# Patient Record
Sex: FEMALE | Race: WHITE | Employment: OTHER | URBAN - NONMETROPOLITAN AREA
[De-identification: names, ages, dates, MRNs, and addresses within clinical notes are randomized per-mention and may not be internally consistent; named-entity substitution may affect disease eponyms.]

---

## 2017-01-16 DIAGNOSIS — I35.0 AORTIC VALVE STENOSIS: Primary | ICD-10-CM

## 2017-01-23 ENCOUNTER — HOSPITAL ENCOUNTER (OUTPATIENT)
Dept: ULTRASOUND IMAGING | Facility: HOSPITAL | Age: 77
Discharge: HOME | End: 2017-01-23
Attending: NURSE PRACTITIONER | Admitting: NURSE PRACTITIONER
Payer: COMMERCIAL

## 2017-01-23 PROCEDURE — 93306 TTE W/DOPPLER COMPLETE: CPT | Mod: 26 | Performed by: INTERNAL MEDICINE

## 2017-01-23 PROCEDURE — 93306 TTE W/DOPPLER COMPLETE: CPT | Mod: TC

## 2017-08-20 ENCOUNTER — HOSPITAL ENCOUNTER (EMERGENCY)
Facility: HOSPITAL | Age: 77
Discharge: HOME | End: 2017-08-20
Attending: NURSE PRACTITIONER | Admitting: NURSE PRACTITIONER
Payer: OTHER MISCELLANEOUS

## 2017-08-20 VITALS
HEART RATE: 58 BPM | RESPIRATION RATE: 16 BRPM | OXYGEN SATURATION: 99 % | SYSTOLIC BLOOD PRESSURE: 217 MMHG | DIASTOLIC BLOOD PRESSURE: 69 MMHG | TEMPERATURE: 97.9 F

## 2017-08-20 DIAGNOSIS — S80.02XA CONTUSION OF LEFT KNEE, INITIAL ENCOUNTER: ICD-10-CM

## 2017-08-20 PROCEDURE — 99213 OFFICE O/P EST LOW 20 MIN: CPT

## 2017-08-20 PROCEDURE — 73562 X-RAY EXAM OF KNEE 3: CPT | Mod: TC,LT

## 2017-08-20 PROCEDURE — 99213 OFFICE O/P EST LOW 20 MIN: CPT | Performed by: NURSE PRACTITIONER

## 2017-08-20 NOTE — ED PROVIDER NOTES
History     Chief Complaint   Patient presents with     Knee Pain     slipped on floor, falling onto left knee about 1300     The history is provided by the patient. No  was used.     Shawanda Luis is a 76 year old female who presents with left knee pain and bruising. Slipped at work and fell directly down onto the knee. Walked since the injury. No previous injury. Increased pain with activity. Nothing taken for pain.     I have reviewed the Medications, Allergies, Past Medical and Surgical History, and Social History in the Epic system.    Allergies: No Known Allergies      No current facility-administered medications on file prior to encounter.   Current Outpatient Prescriptions on File Prior to Encounter:  metoprolol (LOPRESSOR) 25 MG tablet Take 2 tablets (50 mg) by mouth 2 times daily   oxyCODONE (ROXICODONE) 5 MG immediate release tablet Take 1-2 tablets (5-10 mg) by mouth every 4 hours as needed for moderate to severe pain   aspirin  MG EC tablet Take 1 tablet (325 mg) by mouth daily   senna-docusate (SENOKOT-S;PERICOLACE) 8.6-50 MG per tablet Take 1-2 tablets by mouth 2 times daily   amoxicillin (AMOXIL) 500 MG capsule 500mg tablets x four PO one time prior to dental procedure.Take one hour prior to appoint ment time with water   ferrous sulfate 325 (65 FE) MG tablet Take 325 mg by mouth daily (with breakfast)   ATORVASTATIN CALCIUM PO Take 40 mg by mouth   LISINOPRIL PO Take 20 mg by mouth daily    Levothyroxine Sodium (SYNTHROID PO) Take 75 mcg by mouth daily   triamterene-hydrochlorothiazide (MAXZIDE-25) 37.5-25 MG per tablet Take 1 tablet by mouth daily   latanoprost (XALATAN) 0.005 % ophthalmic solution Place 1 drop Into the left eye At Bedtime   Cyanocobalamin (VITAMIN B12 PO) Take by mouth daily       Patient Active Problem List   Diagnosis     Heart murmur     Thyroid disease     Severe aortic stenosis     Syncope     Acute blood loss anemia     GI bleed     Closed  "fibular fracture     CAD (coronary artery disease)     Essential hypertension     Hypothyroidism     Hyperlipidemia     S/P CABG (coronary artery bypass graft)     Nausea     Chest pain       Past Surgical History:   Procedure Laterality Date     BYPASS GRAFT ARTERY CORONARY, REPLACE VALVE AORTIC, COMBINED N/A 12/18/2014    Procedure: COMBINED BYPASS GRAFT ARTERY CORONARY, REPLACE VALVE AORTIC;  Surgeon: Butch English MD;  Location:  OR     ENT SURGERY       EYE SURGERY       PHACOEMULSIFICATION WITH STANDARD INTRAOCULAR LENS IMPLANT  4/8/2014    Procedure: PHACOEMULSIFICATION WITH STANDARD INTRAOCULAR LENS IMPLANT;  CATARACT EXTRACTION WITH INTRA OCULAR LENS LEFT;  Surgeon: Arpit Albright MD;  Location: [facility]       Social History   Substance Use Topics     Smoking status: Former Smoker     Smokeless tobacco: Not on file     Alcohol use No         There is no immunization history on file for this patient.    BMI: Estimated body mass index is 19.99 kg/(m^2) as calculated from the following:    Height as of 3/10/15: 1.664 m (5' 5.5\").    Weight as of 3/10/15: 55.3 kg (122 lb).      Review of Systems   Constitutional: Negative.    Musculoskeletal: Positive for gait problem and joint swelling (Left knee with ecchymosis).   Skin: Positive for color change (Ecchymosis to anterior left knee). Negative for rash and wound.       Physical Exam   BP: (!) 217/69 (hx of hypertension, takes medications for)  Pulse: 58  Temp: 97.9  F (36.6  C)  Resp: 16  SpO2: 99 %  Physical Exam   Constitutional: She is oriented to person, place, and time. She appears well-developed and well-nourished. No distress.   HENT:   Head: Normocephalic and atraumatic.   Nose: Nose normal.   Mouth/Throat: No oropharyngeal exudate.   Eyes: Conjunctivae are normal.   Neck: Normal range of motion.   Cardiovascular: Normal rate.    Pulmonary/Chest: Effort normal.   Musculoskeletal:        Left knee: She exhibits decreased range of motion, swelling, " ecchymosis and bony tenderness. She exhibits no effusion, no deformity, no laceration, no erythema, normal alignment, no LCL laxity, normal patellar mobility, normal meniscus and no MCL laxity.        Left ankle: Normal.        Left upper leg: Normal.        Left lower leg: Normal.        Legs:  Neurological: She is alert and oriented to person, place, and time.   Skin: Skin is warm and dry. She is not diaphoretic.   Psychiatric: She has a normal mood and affect.   Nursing note and vitals reviewed.      ED Course     ED Course     Procedures          Left knee XR: I personally reviewed the x-rays and there is no fracture or dislocation. Radiology review pending and nurse will notify patient if there is any change in the treatment plan.    Labs Ordered and Resulted from Time of ED Arrival Up to the Time of Departure from the ED - No data to display    Assessments & Plan (with Medical Decision Making)     I have reviewed the nursing notes.  I have reviewed the findings, diagnosis, plan and need for follow up with the patient.  Placed in an ace. Ordered a cane for her to get from [facility], crutches would increase her fall risk.   She could use a walker if she had one at home. Advised to ice and elevate.   Take Tylenol for pain.   Follow up with PCP if not improving in the next 3 days.   See PCP if return to work release is needed.   Return here if sx worsen.      Discharge Medication List as of 8/20/2017  6:45 PM      START taking these medications    Details   order for DME Dispense 1 CANE for assistance with walking    Dx: Contusion to left knee d/t fallDisp-1 Units, R-0, Local Print             Final diagnoses:   Contusion of left knee, initial encounter       8/20/2017   [facility]     Analisa Najera NP  08/21/17 [id number]

## 2017-08-20 NOTE — ED AVS SNAPSHOT
[facility]    [address] [address]    Phone:  [phone]                                       Shawanda Luis   MRN: [id number]    Department:  [facility]   Date of Visit:  8/20/2017           After Visit Summary Signature Page     I have received my discharge instructions, and my questions have been answered. I have discussed any challenges I see with this plan with the nurse or doctor.    ..........................................................................................................................................  Patient/Patient Representative Signature      ..........................................................................................................................................  Patient Representative Print Name and Relationship to Patient    ..................................................               ................................................  Date                                            Time    ..........................................................................................................................................  Reviewed by Signature/Title    ...................................................              ..............................................  Date                                                            Time

## 2017-08-20 NOTE — DISCHARGE INSTRUCTIONS
The radiologist will review your x-rays tomorrow. The nurse will call if he reads the x-ray differently.   Wear the ace wrap during the day. Ice and elevate the knee.   Use the cane to assist with walking and decrease the stress to the painful knee.  Take Tylenol for pain.   Avoid overuse or straining the knee.   No work for 3 days.     Call for a follow up appointment to be seen on Wednesday by your PCP or one of his associated.   They will re-evaluate and determine if you should return to work.         Lower Extremity Contusion  You have a contusion (bruise) of a lower extremity (leg, knee, ankle, foot, or toe). Symptoms include pain, swelling, and skin discoloration. No bones are broken. This injury may take from a few days to a few weeks to heal.  During that time, the bruise may change from reddish in color, to purple-blue, to green-yellow, to yellow-brown.  Home care    Unless another medicine was prescribed, you can take acetaminophen, ibuprofen, or naproxen to control pain. (If you have chronic liver or kidney disease or ever had a stomach ulcer or gastrointestinal bleeding, talk with your doctor before using these medicines.)    Elevate the injured area to reduce pain and swelling. As much as possible, sit or lie down with the injured area raised about the level of your heart. This is especially important during the first 48 hours.    Ice the injured area to help reduce pain and swelling. Wrap a cold source (ice pack or ice cubes in a plastic bag) in a thin towel. Apply to the bruised area for 20 minutes every 1 to 2 hours the first day. Continue this 3 to 4 times a day until the pain and swelling goes away.    If crutches have been advised, do not bear full weight on the injured leg until you can do so without pain. You may return to sports when you are able to put full weight and impact on the injured leg without pain.  Follow up  Follow up with your healthcare provider or our staff as advised. Call if  you are not improving within the next 1 to 2 weeks.  When to seek medical advice   Call your healthcare provider right away if any of these occur:    Increased pain or swelling    Foot or toes become cold, blue, numb or tingly    Signs of infection: Warmth, drainage, or increased redness or pain around the injury    Inability to move the injured area     Frequent bruising for unknown reasons  Date Last Reviewed: 2/1/2017 2000-2017 [employer]. [address] [place] [address], [place] [address]. All rights reserved. This information is not intended as a substitute for professional medical care. Always follow your healthcare professional's instructions.

## 2017-08-20 NOTE — ED AVS SNAPSHOT
[facility]    [address] [address]    Phone:  [phone]                                       Shawanda Luis   MRN: [id number]    Department:  [facility]   Date of Visit:  8/20/2017           Patient Information     Date Of Birth          1940        Your diagnoses for this visit were:     Contusion of left knee, initial encounter        You were seen by Analisa Najera NP.      Follow-up Information     Follow up with Denton Baugh MD. Schedule an appointment as soon as possible for a visit in 3 days.    Specialty:  Family Practice    Why:  for re-evaluation and release to work    Contact information:    [facility]  [address] [address] [phone]          Follow up with [facility].    Specialty:  EMERGENCY MEDICINE    Why:  If symptoms worsen    Contact information:    [address] [address] [phone]    Additional information:    From [facility]: Take [address]. Turn left at [address]. Turn left at the first stoplight on [address]. At the first stop sign, take a right onto [address]. Take a left into the parking lot and continue through until you reach the North enterance of the building.       From [place]: Take [address]. Take the [address] ramp towards [place]. Turn left onto [address]. Take a slight right onto [address]. Turn left at the first stoplight on [address]. At the first stop sign, take a right onto [address]. Take a left into the parking lot and continue through until you reach the North enterance of the building.       From [place]: Take [address]. Take a right at [address]. At the first stop sign, take a right onto [address]. Take a left into the parking lot and continue through until you reach the North enterance of the building.         Discharge  Infant transferred to 3023 via mother's arms in stable condition.  Report given to postpartum RN.  ID bands verified.      Sussy Roman RN   Instructions       The radiologist will review your x-rays tomorrow. The nurse will call if he reads the x-ray differently.   Wear the ace wrap during the day. Ice and elevate the knee.   Use the cane to assist with walking and decrease the stress to the painful knee.  Take Tylenol for pain.   Avoid overuse or straining the knee.   No work for 3 days.     Call for a follow up appointment to be seen on Wednesday by your PCP or one of his associated.   They will re-evaluate and determine if you should return to work.         Lower Extremity Contusion  You have a contusion (bruise) of a lower extremity (leg, knee, ankle, foot, or toe). Symptoms include pain, swelling, and skin discoloration. No bones are broken. This injury may take from a few days to a few weeks to heal.  During that time, the bruise may change from reddish in color, to purple-blue, to green-yellow, to yellow-brown.  Home care    Unless another medicine was prescribed, you can take acetaminophen, ibuprofen, or naproxen to control pain. (If you have chronic liver or kidney disease or ever had a stomach ulcer or gastrointestinal bleeding, talk with your doctor before using these medicines.)    Elevate the injured area to reduce pain and swelling. As much as possible, sit or lie down with the injured area raised about the level of your heart. This is especially important during the first 48 hours.    Ice the injured area to help reduce pain and swelling. Wrap a cold source (ice pack or ice cubes in a plastic bag) in a thin towel. Apply to the bruised area for 20 minutes every 1 to 2 hours the first day. Continue this 3 to 4 times a day until the pain and swelling goes away.    If crutches have been advised, do not bear full weight on the injured leg until you can do so without pain. You may return to sports when you are able to put full weight and impact on the injured leg without pain.  Follow up  Follow up with your healthcare provider or our staff as  advised. Call if you are not improving within the next 1 to 2 weeks.  When to seek medical advice   Call your healthcare provider right away if any of these occur:    Increased pain or swelling    Foot or toes become cold, blue, numb or tingly    Signs of infection: Warmth, drainage, or increased redness or pain around the injury    Inability to move the injured area     Frequent bruising for unknown reasons  Date Last Reviewed: 2/1/2017 2000-2017 [employer]. [address] [place] [address], [place] [address]. All rights reserved. This information is not intended as a substitute for professional medical care. Always follow your healthcare professional's instructions.             Review of your medicines      START taking        Dose / Directions Last dose taken    order for DME   Quantity:  1 Units        Dispense 1 CANE for assistance with walking  Dx: Contusion to left knee d/t fall   Refills:  0          Our records show that you are taking the medicines listed below. If these are incorrect, please call your family doctor or clinic.        Dose / Directions Last dose taken    amoxicillin 500 MG capsule   Commonly known as:  AMOXIL   Quantity:  4 capsule        500mg tablets x four PO one time prior to dental procedure. Take one hour prior to appoint ment time with water   Refills:  3        aspirin 325 MG EC tablet   Dose:  325 mg   Quantity:  40 tablet        Take 1 tablet (325 mg) by mouth daily   Refills:  0        ATORVASTATIN CALCIUM PO   Dose:  40 mg        Take 40 mg by mouth   Refills:  0        ferrous sulfate 325 (65 FE) MG tablet   Commonly known as:  IRON   Dose:  325 mg        Take 325 mg by mouth daily (with breakfast)   Refills:  0        latanoprost 0.005 % ophthalmic solution   Commonly known as:  XALATAN   Dose:  1 drop        Place 1 drop Into the left eye At Bedtime   Refills:  0        LISINOPRIL PO   Dose:  20 mg        Take 20 mg by mouth daily   Refills:  0        metoprolol 25 MG  tablet   Commonly known as:  LOPRESSOR   Dose:  50 mg   Quantity:  60 tablet        Take 2 tablets (50 mg) by mouth 2 times daily   Refills:  6        oxyCODONE 5 MG IR tablet   Commonly known as:  ROXICODONE   Dose:  5-10 mg   Quantity:  60 tablet        Take 1-2 tablets (5-10 mg) by mouth every 4 hours as needed for moderate to severe pain   Refills:  0        senna-docusate 8.6-50 MG per tablet   Commonly known as:  SENOKOT-S;PERICOLACE   Dose:  1-2 tablet   Quantity:  7 tablet        Take 1-2 tablets by mouth 2 times daily   Refills:  0        SYNTHROID PO   Dose:  75 mcg        Take 75 mcg by mouth daily   Refills:  0        triamterene-hydrochlorothiazide 37.5-25 MG per tablet   Commonly known as:  MAXZIDE-25   Dose:  1 tablet        Take 1 tablet by mouth daily   Refills:  0        VITAMIN B12 PO        Take by mouth daily   Refills:  0                Prescriptions were sent or printed at these locations (1 Prescription)                   [id number] - [place] - [address]   [address] [place] [address]    Telephone:  [phone]   Fax:  [phone]   Hours:                  Printed at Department/Unit printer (1 of 1)         order for DME                Procedures and tests performed during your visit     Knee XR, 3 views, left      Orders Needing Specimen Collection     None      Pending Results     Date and Time Order Name Status Description    8/20/2017 1741 Knee XR, 3 views, left In process             Pending Culture Results     No orders found from 8/18/2017 to 8/21/2017.            Thank you for choosing [facility]       Thank you for choosing [facility] for your care. Our goal is always to provide you with excellent care. Hearing back from our patients is one way we can continue to improve our services. Please take a few minutes to complete the written survey that you may receive in the mail after you visit with us. Thank you!        MyChart Information     MyChart lets  "you send messages to your doctor, view your test results, renew your prescriptions, schedule appointments and more. To sign up, go to [email or web address] . Click on \"Log in\" on the left side of the screen, which will take you to the Welcome page. Then click on \"Sign up Now\" on the right side of the page.     You will be asked to enter the access code listed below, as well as some personal information. Please follow the directions to create your username and password.     Your access code is: [email or web address]  Expires: 2017  6:45 PM     Your access code will  in 90 days. If you need help or a new code, please call your [facility] clinic or [phone].        Care EveryWhere ID     This is your Care EveryWhere ID. This could be used by other organizations to access your [facility] medical records  [id number]        Equal Access to Services     MARGIE PAREDES : Hadii paul Segundo, waaxda monica, qaybta kaalmada federico, abdirashid muñoz. So [facility] [phone].    ATENCIÓN: Si habla español, tiene a al disposición servicios gratuitos de asistencia lingüística. Llame al [phone].    We comply with applicable federal civil rights laws and [place] laws. We do not discriminate on the basis of race, color, national origin, age, disability sex, sexual orientation or gender identity.            After Visit Summary       This is your record. Keep this with you and show to your community pharmacist(s) and doctor(s) at your next visit.                  "

## 2017-08-20 NOTE — ED NOTES
At 1300 slipped on floor, landing on left knee. Pain /swelling of left knee. States pain travels into ship area at times. Has been walking but slowly.

## 2017-08-20 NOTE — ED NOTES
Patient presents with swollen and painful Lt knee.  Patient slipped on the floor at work and fell on the Lt knee.

## 2017-08-21 ASSESSMENT — ENCOUNTER SYMPTOMS
WOUND: 0
COLOR CHANGE: 1
JOINT SWELLING: 1
CONSTITUTIONAL NEGATIVE: 1

## 2017-08-23 NOTE — PROGRESS NOTES
Left Knee XR report routed to PCP, Dr. THAI Baugh.  IMPRESSION:  NO ACUTE ABNORMALITY.  THERE IS SOME DIFFUSE JOINT SPACE NARROWING ON THESE NON-WEIGHTBEARING VIEWS. Follow up with PCP if not improving in the next 3 days.  See PCP if return to work release is needed.

## 2017-08-31 DIAGNOSIS — M25.462 KNEE EFFUSION, LEFT: ICD-10-CM

## 2017-08-31 DIAGNOSIS — S89.90XA KNEE INJURY: Primary | ICD-10-CM

## 2017-09-06 ENCOUNTER — HOSPITAL ENCOUNTER (OUTPATIENT)
Dept: MRI IMAGING | Facility: HOSPITAL | Age: 77
Discharge: HOME | End: 2017-09-06
Attending: FAMILY MEDICINE | Admitting: FAMILY MEDICINE
Payer: OTHER MISCELLANEOUS

## 2017-09-06 PROCEDURE — 73721 MRI JNT OF LWR EXTRE W/O DYE: CPT | Mod: TC,LT

## 2019-01-01 ENCOUNTER — HOSPITAL ENCOUNTER (OUTPATIENT)
Dept: MRI IMAGING | Facility: HOSPITAL | Age: 79
Discharge: HOME | End: 2019-05-16
Attending: FAMILY MEDICINE | Admitting: FAMILY MEDICINE
Payer: MEDICARE

## 2019-01-01 DIAGNOSIS — M54.16 LEFT LUMBAR RADICULOPATHY: ICD-10-CM

## 2019-01-01 LAB
CREAT BLD-MCNC: 0.8 MG/DL (ref 0.52–1.04)
GFR SERPL CREATININE-BSD FRML MDRD: 70 ML/MIN/{1.73_M2}

## 2019-01-01 PROCEDURE — 82565 ASSAY OF CREATININE: CPT

## 2019-01-01 PROCEDURE — 72158 MRI LUMBAR SPINE W/O & W/DYE: CPT | Mod: TC

## 2019-01-01 PROCEDURE — 25500064 ZZH RX 255 OP 636: Performed by: RADIOLOGY

## 2019-01-01 PROCEDURE — A9585 GADOBUTROL INJECTION: HCPCS | Performed by: RADIOLOGY

## 2019-01-01 RX ORDER — GADOBUTROL 604.72 MG/ML
2 INJECTION INTRAVENOUS ONCE
Status: COMPLETED | OUTPATIENT
Start: 2019-01-01 | End: 2019-01-01

## 2019-01-01 RX ADMIN — GADOBUTROL 2 ML: 604.72 INJECTION INTRAVENOUS at 14:52

## 2019-01-22 ENCOUNTER — HOSPITAL ENCOUNTER (OUTPATIENT)
Dept: MRI IMAGING | Facility: HOSPITAL | Age: 79
Discharge: HOME | End: 2019-01-22
Attending: FAMILY MEDICINE | Admitting: FAMILY MEDICINE
Payer: MEDICARE

## 2019-01-22 DIAGNOSIS — I70.213 ATHEROSCLEROSIS OF NATIVE ARTERY OF BOTH LOWER EXTREMITIES WITH INTERMITTENT CLAUDICATION (H): ICD-10-CM

## 2019-01-22 DIAGNOSIS — M79.671 PAIN IN BOTH FEET: ICD-10-CM

## 2019-01-22 DIAGNOSIS — M79.672 PAIN IN BOTH FEET: ICD-10-CM

## 2019-01-22 LAB
CREAT BLD-MCNC: 0.9 MG/DL (ref 0.52–1.04)
GFR SERPL CREATININE-BSD FRML MDRD: 61 ML/MIN/{1.73_M2}

## 2019-01-22 PROCEDURE — A9585 GADOBUTROL INJECTION: HCPCS | Performed by: RADIOLOGY

## 2019-01-22 PROCEDURE — 73725 MR ANG LWR EXT W OR W/O DYE: CPT | Mod: 50

## 2019-01-22 PROCEDURE — 25500064 ZZH RX 255 OP 636: Performed by: RADIOLOGY

## 2019-01-22 PROCEDURE — 82565 ASSAY OF CREATININE: CPT

## 2019-01-22 RX ORDER — GADOBUTROL 604.72 MG/ML
10 INJECTION INTRAVENOUS ONCE
Status: COMPLETED | OUTPATIENT
Start: 2019-01-22 | End: 2019-01-22

## 2019-01-22 RX ADMIN — GADOBUTROL 10 ML: 604.72 INJECTION INTRAVENOUS at 10:35

## 2020-01-01 ENCOUNTER — HOSPITAL ENCOUNTER (OUTPATIENT)
Dept: ULTRASOUND IMAGING | Facility: HOSPITAL | Age: 80
End: 2020-02-06
Attending: NURSE PRACTITIONER
Payer: MEDICARE

## 2020-01-01 ENCOUNTER — HOSPITAL ENCOUNTER (OUTPATIENT)
Facility: HOSPITAL | Age: 80
Discharge: HOME | End: 2020-02-07
Attending: RADIOLOGY | Admitting: RADIOLOGY
Payer: MEDICARE

## 2020-01-01 ENCOUNTER — APPOINTMENT (OUTPATIENT)
Dept: CT IMAGING | Facility: HOSPITAL | Age: 80
End: 2020-01-01
Attending: EMERGENCY MEDICINE
Payer: MEDICARE

## 2020-01-01 ENCOUNTER — APPOINTMENT (OUTPATIENT)
Dept: GENERAL RADIOLOGY | Facility: HOSPITAL | Age: 80
End: 2020-01-01
Attending: EMERGENCY MEDICINE
Payer: MEDICARE

## 2020-01-01 ENCOUNTER — DOCUMENTATION ONLY (OUTPATIENT)
Dept: INTERVENTIONAL RADIOLOGY/VASCULAR | Facility: HOSPITAL | Age: 80
End: 2020-01-01

## 2020-01-01 ENCOUNTER — TRANSFERRED RECORDS (OUTPATIENT)
Dept: HEALTH INFORMATION MANAGEMENT | Facility: CLINIC | Age: 80
End: 2020-01-01

## 2020-01-01 ENCOUNTER — HOSPITAL ENCOUNTER (EMERGENCY)
Facility: HOSPITAL | Age: 80
Discharge: HOME | End: 2020-03-07
Attending: EMERGENCY MEDICINE | Admitting: EMERGENCY MEDICINE
Payer: MEDICARE

## 2020-01-01 ENCOUNTER — HOSPITAL ENCOUNTER (EMERGENCY)
Facility: HOSPITAL | Age: 80
Discharge: ANOTHER HOSPITAL | End: 2020-01-27
Attending: EMERGENCY MEDICINE | Admitting: EMERGENCY MEDICINE
Payer: MEDICARE

## 2020-01-01 ENCOUNTER — DOCUMENTATION ONLY (OUTPATIENT)
Dept: CARE COORDINATION | Facility: OTHER | Age: 80
End: 2020-01-01

## 2020-01-01 ENCOUNTER — HOSPITAL ENCOUNTER (EMERGENCY)
Facility: HOSPITAL | Age: 80
Discharge: HOME | End: 2020-02-24
Attending: EMERGENCY MEDICINE | Admitting: EMERGENCY MEDICINE
Payer: MEDICARE

## 2020-01-01 VITALS
DIASTOLIC BLOOD PRESSURE: 65 MMHG | SYSTOLIC BLOOD PRESSURE: 147 MMHG | HEART RATE: 86 BPM | OXYGEN SATURATION: 90 % | RESPIRATION RATE: 16 BRPM

## 2020-01-01 VITALS
HEART RATE: 85 BPM | TEMPERATURE: 97.9 F | BODY MASS INDEX: 19.01 KG/M2 | RESPIRATION RATE: 19 BRPM | DIASTOLIC BLOOD PRESSURE: 78 MMHG | OXYGEN SATURATION: 92 % | WEIGHT: 116 LBS | SYSTOLIC BLOOD PRESSURE: 151 MMHG

## 2020-01-01 VITALS
OXYGEN SATURATION: 93 % | TEMPERATURE: 98.3 F | RESPIRATION RATE: 20 BRPM | SYSTOLIC BLOOD PRESSURE: 148 MMHG | WEIGHT: 124.34 LBS | HEART RATE: 84 BPM | BODY MASS INDEX: 20.38 KG/M2 | DIASTOLIC BLOOD PRESSURE: 73 MMHG

## 2020-01-01 VITALS
SYSTOLIC BLOOD PRESSURE: 106 MMHG | OXYGEN SATURATION: 91 % | HEART RATE: 99 BPM | TEMPERATURE: 98.3 F | DIASTOLIC BLOOD PRESSURE: 65 MMHG | RESPIRATION RATE: 15 BRPM

## 2020-01-01 DIAGNOSIS — D62 ANEMIA DUE TO BLOOD LOSS, ACUTE: Primary | ICD-10-CM

## 2020-01-01 DIAGNOSIS — M21.372 LEFT FOOT DROP: Primary | ICD-10-CM

## 2020-01-01 DIAGNOSIS — N94.89 ADNEXAL MASS: ICD-10-CM

## 2020-01-01 DIAGNOSIS — R18.0 MALIGNANT ASCITES (H): ICD-10-CM

## 2020-01-01 DIAGNOSIS — Z95.1 S/P CABG (CORONARY ARTERY BYPASS GRAFT): ICD-10-CM

## 2020-01-01 DIAGNOSIS — I73.9 PERIPHERAL VASCULAR OCCLUSIVE DISEASE (H): Primary | ICD-10-CM

## 2020-01-01 DIAGNOSIS — R79.89 ELEVATED TROPONIN: ICD-10-CM

## 2020-01-01 DIAGNOSIS — R79.1 SUPRATHERAPEUTIC INR: ICD-10-CM

## 2020-01-01 LAB
ABO + RH BLD: NORMAL
ALBUMIN SERPL-MCNC: 1.5 G/DL (ref 3.4–5)
ALBUMIN SERPL-MCNC: 1.8 G/DL (ref 3.4–5)
ALBUMIN SERPL-MCNC: 2.2 G/DL (ref 3.4–5)
ALBUMIN UR-MCNC: 30 MG/DL
ALP SERPL-CCNC: 47 U/L (ref 40–150)
ALP SERPL-CCNC: 55 U/L (ref 40–150)
ALP SERPL-CCNC: 58 U/L (ref 40–150)
ALT SERPL W P-5'-P-CCNC: 10 U/L (ref 0–50)
ALT SERPL W P-5'-P-CCNC: 34 U/L (ref 0–50)
ALT SERPL W P-5'-P-CCNC: 9 U/L (ref 0–50)
ANION GAP SERPL CALCULATED.3IONS-SCNC: 11 MMOL/L (ref 3–14)
ANION GAP SERPL CALCULATED.3IONS-SCNC: 15 MMOL/L (ref 3–14)
ANION GAP SERPL CALCULATED.3IONS-SCNC: 9 MMOL/L (ref 3–14)
APPEARANCE UR: ABNORMAL
APTT PPP: 56 SEC (ref 22–37)
APTT PPP: 61 SEC (ref 22–37)
AST SERPL W P-5'-P-CCNC: 20 U/L (ref 0–45)
AST SERPL W P-5'-P-CCNC: 28 U/L (ref 0–45)
AST SERPL W P-5'-P-CCNC: 299 U/L (ref 0–45)
BACTERIA #/AREA URNS HPF: ABNORMAL /HPF
BACTERIA SPEC CULT: NORMAL
BASOPHILS # BLD AUTO: 0 10E9/L (ref 0–0.2)
BASOPHILS # BLD AUTO: 0.1 10E9/L (ref 0–0.2)
BASOPHILS # BLD AUTO: 0.1 10E9/L (ref 0–0.2)
BASOPHILS NFR BLD AUTO: 0.1 %
BASOPHILS NFR BLD AUTO: 0.4 %
BASOPHILS NFR BLD AUTO: 1 %
BILIRUB DIRECT SERPL-MCNC: 0.7 MG/DL (ref 0–0.2)
BILIRUB SERPL-MCNC: 0.5 MG/DL (ref 0.2–1.3)
BILIRUB SERPL-MCNC: 0.6 MG/DL (ref 0.2–1.3)
BILIRUB SERPL-MCNC: 1 MG/DL (ref 0.2–1.3)
BILIRUB UR QL STRIP: NEGATIVE
BLD GP AB SCN SERPL QL: NORMAL
BLD GP AB SCN SERPL QL: NORMAL
BLD PROD TYP BPU: NORMAL
BLD PROD TYP BPU: NORMAL
BLD UNIT ID BPU: 0
BLOOD BANK CMNT PATIENT-IMP: NORMAL
BLOOD BANK CMNT PATIENT-IMP: NORMAL
BLOOD PRODUCT CODE: NORMAL
BPU ID: NORMAL
BUN SERPL-MCNC: 13 MG/DL (ref 7–30)
BUN SERPL-MCNC: 24 MG/DL (ref 7–30)
BUN SERPL-MCNC: 29 MG/DL (ref 7–30)
CALCIUM SERPL-MCNC: 7.8 MG/DL (ref 8.5–10.1)
CALCIUM SERPL-MCNC: 8.3 MG/DL (ref 8.5–10.1)
CALCIUM SERPL-MCNC: 8.5 MG/DL (ref 8.5–10.1)
CHLORIDE SERPL-SCNC: 90 MMOL/L (ref 94–109)
CHLORIDE SERPL-SCNC: 95 MMOL/L (ref 94–109)
CHLORIDE SERPL-SCNC: 96 MMOL/L (ref 94–109)
CO2 SERPL-SCNC: 21 MMOL/L (ref 20–32)
CO2 SERPL-SCNC: 26 MMOL/L (ref 20–32)
CO2 SERPL-SCNC: 28 MMOL/L (ref 20–32)
COLOR UR AUTO: YELLOW
COPATH REPORT: NORMAL
CREAT SERPL-MCNC: 0.71 MG/DL (ref 0.52–1.04)
CREAT SERPL-MCNC: 1.39 MG/DL (ref 0.52–1.04)
CREAT SERPL-MCNC: 1.42 MG/DL (ref 0.52–1.04)
CRP SERPL-MCNC: 127 MG/L (ref 0–8)
DIFFERENTIAL METHOD BLD: ABNORMAL
EOSINOPHIL # BLD AUTO: 0 10E9/L (ref 0–0.7)
EOSINOPHIL # BLD AUTO: 0 10E9/L (ref 0–0.7)
EOSINOPHIL # BLD AUTO: 0.1 10E9/L (ref 0–0.7)
EOSINOPHIL NFR BLD AUTO: 0 %
EOSINOPHIL NFR BLD AUTO: 0.2 %
EOSINOPHIL NFR BLD AUTO: 0.9 %
ERYTHROCYTE [DISTWIDTH] IN BLOOD BY AUTOMATED COUNT: 14.1 % (ref 10–15)
ERYTHROCYTE [DISTWIDTH] IN BLOOD BY AUTOMATED COUNT: 16.9 % (ref 10–15)
ERYTHROCYTE [DISTWIDTH] IN BLOOD BY AUTOMATED COUNT: 19.9 % (ref 10–15)
ERYTHROCYTE [SEDIMENTATION RATE] IN BLOOD BY WESTERGREN METHOD: 97 MM/H (ref 0–30)
GFR SERPL CREATININE-BSD FRML MDRD: 35 ML/MIN/{1.73_M2}
GFR SERPL CREATININE-BSD FRML MDRD: 36 ML/MIN/{1.73_M2}
GFR SERPL CREATININE-BSD FRML MDRD: 82 ML/MIN/{1.73_M2}
GLUCOSE BLDC GLUCOMTR-MCNC: 111 MG/DL (ref 70–99)
GLUCOSE SERPL-MCNC: 107 MG/DL (ref 70–99)
GLUCOSE SERPL-MCNC: 128 MG/DL (ref 70–99)
GLUCOSE SERPL-MCNC: 137 MG/DL (ref 70–99)
GLUCOSE UR STRIP-MCNC: NEGATIVE MG/DL
HCT VFR BLD AUTO: 20.6 % (ref 35–47)
HCT VFR BLD AUTO: 31.5 % (ref 35–47)
HCT VFR BLD AUTO: 32.1 % (ref 35–47)
HGB BLD-MCNC: 10.1 G/DL (ref 11.7–15.7)
HGB BLD-MCNC: 10.6 G/DL (ref 11.7–15.7)
HGB BLD-MCNC: 6.5 G/DL (ref 11.7–15.7)
HGB UR QL STRIP: ABNORMAL
HYALINE CASTS #/AREA URNS LPF: 11 /LPF
IMM GRANULOCYTES # BLD: 0.1 10E9/L (ref 0–0.4)
IMM GRANULOCYTES NFR BLD: 0.6 %
IMM GRANULOCYTES NFR BLD: 0.7 %
IMM GRANULOCYTES NFR BLD: 0.9 %
INR PPP: 1 (ref 0.9–1.2)
INR PPP: 2.33 (ref 0.86–1.14)
INR PPP: 6.27 (ref 0.86–1.14)
KETONES UR STRIP-MCNC: 10 MG/DL
LACTATE BLD-SCNC: 1.5 MMOL/L (ref 0.7–2)
LEUKOCYTE ESTERASE UR QL STRIP: ABNORMAL
LYMPHOCYTES # BLD AUTO: 0.3 10E9/L (ref 0.8–5.3)
LYMPHOCYTES # BLD AUTO: 0.6 10E9/L (ref 0.8–5.3)
LYMPHOCYTES # BLD AUTO: 0.7 10E9/L (ref 0.8–5.3)
LYMPHOCYTES NFR BLD AUTO: 2.9 %
LYMPHOCYTES NFR BLD AUTO: 5.5 %
LYMPHOCYTES NFR BLD AUTO: 7 %
MCH RBC QN AUTO: 25.6 PG (ref 26.5–33)
MCH RBC QN AUTO: 25.9 PG (ref 26.5–33)
MCH RBC QN AUTO: 26.9 PG (ref 26.5–33)
MCHC RBC AUTO-ENTMCNC: 31.5 G/DL (ref 31.5–36.5)
MCHC RBC AUTO-ENTMCNC: 31.6 G/DL (ref 31.5–36.5)
MCHC RBC AUTO-ENTMCNC: 33.7 G/DL (ref 31.5–36.5)
MCV RBC AUTO: 80 FL (ref 78–100)
MCV RBC AUTO: 81 FL (ref 78–100)
MCV RBC AUTO: 82 FL (ref 78–100)
MONOCYTES # BLD AUTO: 0.6 10E9/L (ref 0–1.3)
MONOCYTES # BLD AUTO: 0.7 10E9/L (ref 0–1.3)
MONOCYTES # BLD AUTO: 0.7 10E9/L (ref 0–1.3)
MONOCYTES NFR BLD AUTO: 5 %
MONOCYTES NFR BLD AUTO: 6.1 %
MONOCYTES NFR BLD AUTO: 7.3 %
MUCOUS THREADS #/AREA URNS LPF: PRESENT /LPF
NEUTROPHILS # BLD AUTO: 10 10E9/L (ref 1.6–8.3)
NEUTROPHILS # BLD AUTO: 11.1 10E9/L (ref 1.6–8.3)
NEUTROPHILS # BLD AUTO: 7.5 10E9/L (ref 1.6–8.3)
NEUTROPHILS NFR BLD AUTO: 83.2 %
NEUTROPHILS NFR BLD AUTO: 88.5 %
NEUTROPHILS NFR BLD AUTO: 89.7 %
NITRATE UR QL: NEGATIVE
NRBC # BLD AUTO: 0 10*3/UL
NRBC BLD AUTO-RTO: 0 /100
NUM BPU REQUESTED: 3
PH UR STRIP: 5 PH (ref 4.7–8)
PLATELET # BLD AUTO: 287 10E9/L (ref 150–450)
PLATELET # BLD AUTO: 403 10E9/L (ref 150–450)
PLATELET # BLD AUTO: 611 10E9/L (ref 150–450)
POTASSIUM SERPL-SCNC: 3.5 MMOL/L (ref 3.4–5.3)
POTASSIUM SERPL-SCNC: 3.6 MMOL/L (ref 3.4–5.3)
POTASSIUM SERPL-SCNC: 4.5 MMOL/L (ref 3.4–5.3)
PROT SERPL-MCNC: 6.1 G/DL (ref 6.8–8.8)
PROT SERPL-MCNC: 7.2 G/DL (ref 6.8–8.8)
PROT SERPL-MCNC: 7.4 G/DL (ref 6.8–8.8)
RBC # BLD AUTO: 2.51 10E12/L (ref 3.8–5.2)
RBC # BLD AUTO: 3.94 10E12/L (ref 3.8–5.2)
RBC # BLD AUTO: 3.95 10E12/L (ref 3.8–5.2)
RBC #/AREA URNS AUTO: 2 /HPF (ref 0–2)
SODIUM SERPL-SCNC: 127 MMOL/L (ref 133–144)
SODIUM SERPL-SCNC: 132 MMOL/L (ref 133–144)
SODIUM SERPL-SCNC: 132 MMOL/L (ref 133–144)
SOURCE: ABNORMAL
SP GR UR STRIP: 1.02 (ref 1–1.03)
SPECIMEN EXP DATE BLD: NORMAL
SPECIMEN EXP DATE BLD: NORMAL
SPECIMEN SOURCE: NORMAL
SQUAMOUS #/AREA URNS AUTO: 1 /HPF (ref 0–1)
TRANSFUSION STATUS PATIENT QL: NORMAL
TRANSFUSION STATUS PATIENT QL: NORMAL
TROPONIN I SERPL-MCNC: 1.56 UG/L (ref 0–0.04)
TROPONIN I SERPL-MCNC: <0.015 UG/L (ref 0–0.04)
TROPONIN I SERPL-MCNC: <0.015 UG/L (ref 0–0.04)
UROBILINOGEN UR STRIP-MCNC: 2 MG/DL (ref 0–2)
WBC # BLD AUTO: 11.1 10E9/L (ref 4–11)
WBC # BLD AUTO: 12.5 10E9/L (ref 4–11)
WBC # BLD AUTO: 9 10E9/L (ref 4–11)
WBC #/AREA URNS AUTO: 6 /HPF (ref 0–5)

## 2020-01-01 PROCEDURE — 25800030 ZZH RX IP 258 OP 636: Performed by: EMERGENCY MEDICINE

## 2020-01-01 PROCEDURE — 84484 ASSAY OF TROPONIN QUANT: CPT | Performed by: EMERGENCY MEDICINE

## 2020-01-01 PROCEDURE — 96375 TX/PRO/DX INJ NEW DRUG ADDON: CPT

## 2020-01-01 PROCEDURE — 96365 THER/PROPH/DIAG IV INF INIT: CPT | Mod: XU

## 2020-01-01 PROCEDURE — 80053 COMPREHEN METABOLIC PANEL: CPT | Performed by: EMERGENCY MEDICINE

## 2020-01-01 PROCEDURE — 70498 CT ANGIOGRAPHY NECK: CPT | Mod: TC

## 2020-01-01 PROCEDURE — 36415 COLL VENOUS BLD VENIPUNCTURE: CPT | Performed by: EMERGENCY MEDICINE

## 2020-01-01 PROCEDURE — 72131 CT LUMBAR SPINE W/O DYE: CPT | Mod: TC

## 2020-01-01 PROCEDURE — 85025 COMPLETE CBC W/AUTO DIFF WBC: CPT | Performed by: EMERGENCY MEDICINE

## 2020-01-01 PROCEDURE — 70496 CT ANGIOGRAPHY HEAD: CPT | Mod: TC

## 2020-01-01 PROCEDURE — 00000146 ZZHCL STATISTIC GLUCOSE BY METER IP

## 2020-01-01 PROCEDURE — 99285 EMERGENCY DEPT VISIT HI MDM: CPT | Mod: Z6 | Performed by: EMERGENCY MEDICINE

## 2020-01-01 PROCEDURE — 88305 TISSUE EXAM BY PATHOLOGIST: CPT | Mod: TC | Performed by: RADIOLOGY

## 2020-01-01 PROCEDURE — 25000128 H RX IP 250 OP 636: Performed by: EMERGENCY MEDICINE

## 2020-01-01 PROCEDURE — 86901 BLOOD TYPING SEROLOGIC RH(D): CPT | Performed by: EMERGENCY MEDICINE

## 2020-01-01 PROCEDURE — 80076 HEPATIC FUNCTION PANEL: CPT | Performed by: EMERGENCY MEDICINE

## 2020-01-01 PROCEDURE — 99285 EMERGENCY DEPT VISIT HI MDM: CPT | Mod: 25

## 2020-01-01 PROCEDURE — 25000132 ZZH RX MED GY IP 250 OP 250 PS 637: Mod: GY | Performed by: EMERGENCY MEDICINE

## 2020-01-01 PROCEDURE — 99291 CRITICAL CARE FIRST HOUR: CPT | Mod: 25

## 2020-01-01 PROCEDURE — 85730 THROMBOPLASTIN TIME PARTIAL: CPT | Performed by: EMERGENCY MEDICINE

## 2020-01-01 PROCEDURE — 86140 C-REACTIVE PROTEIN: CPT | Performed by: EMERGENCY MEDICINE

## 2020-01-01 PROCEDURE — 71045 X-RAY EXAM CHEST 1 VIEW: CPT | Mod: TC,XU

## 2020-01-01 PROCEDURE — 36430 TRANSFUSION BLD/BLD COMPNT: CPT

## 2020-01-01 PROCEDURE — C9113 INJ PANTOPRAZOLE SODIUM, VIA: HCPCS | Performed by: EMERGENCY MEDICINE

## 2020-01-01 PROCEDURE — 87086 URINE CULTURE/COLONY COUNT: CPT | Performed by: EMERGENCY MEDICINE

## 2020-01-01 PROCEDURE — 86900 BLOOD TYPING SEROLOGIC ABO: CPT | Performed by: EMERGENCY MEDICINE

## 2020-01-01 PROCEDURE — P9016 RBC LEUKOCYTES REDUCED: HCPCS | Performed by: EMERGENCY MEDICINE

## 2020-01-01 PROCEDURE — 93010 ELECTROCARDIOGRAM REPORT: CPT | Performed by: INTERNAL MEDICINE

## 2020-01-01 PROCEDURE — 86920 COMPATIBILITY TEST SPIN: CPT | Performed by: EMERGENCY MEDICINE

## 2020-01-01 PROCEDURE — 93005 ELECTROCARDIOGRAM TRACING: CPT

## 2020-01-01 PROCEDURE — 25500064 ZZH RX 255 OP 636: Performed by: EMERGENCY MEDICINE

## 2020-01-01 PROCEDURE — 25000125 ZZHC RX 250

## 2020-01-01 PROCEDURE — 00000102 ZZHCL STATISTIC CYTO WRIGHT STAIN TC: Performed by: RADIOLOGY

## 2020-01-01 PROCEDURE — 86850 RBC ANTIBODY SCREEN: CPT | Performed by: EMERGENCY MEDICINE

## 2020-01-01 PROCEDURE — 70450 CT HEAD/BRAIN W/O DYE: CPT | Mod: TC

## 2020-01-01 PROCEDURE — 80048 BASIC METABOLIC PNL TOTAL CA: CPT | Performed by: EMERGENCY MEDICINE

## 2020-01-01 PROCEDURE — 93005 ELECTROCARDIOGRAM TRACING: CPT | Mod: XU

## 2020-01-01 PROCEDURE — 00000155 ZZHCL STATISTIC H-CELL BLOCK W/STAIN: Performed by: RADIOLOGY

## 2020-01-01 PROCEDURE — 36415 COLL VENOUS BLD VENIPUNCTURE: CPT | Mod: 59 | Performed by: EMERGENCY MEDICINE

## 2020-01-01 PROCEDURE — 27210238 US PARACENTESIS: Mod: TC

## 2020-01-01 PROCEDURE — 83605 ASSAY OF LACTIC ACID: CPT | Performed by: EMERGENCY MEDICINE

## 2020-01-01 PROCEDURE — 85652 RBC SED RATE AUTOMATED: CPT | Performed by: EMERGENCY MEDICINE

## 2020-01-01 PROCEDURE — 85610 PROTHROMBIN TIME: CPT | Performed by: EMERGENCY MEDICINE

## 2020-01-01 PROCEDURE — 71275 CT ANGIOGRAPHY CHEST: CPT | Mod: TC

## 2020-01-01 PROCEDURE — 81001 URINALYSIS AUTO W/SCOPE: CPT | Performed by: EMERGENCY MEDICINE

## 2020-01-01 PROCEDURE — 40000275 ZZH STATISTIC RCP TIME EA 10 MIN

## 2020-01-01 PROCEDURE — 88108 CYTOPATH CONCENTRATE TECH: CPT | Mod: TC | Performed by: RADIOLOGY

## 2020-01-01 PROCEDURE — 96374 THER/PROPH/DIAG INJ IV PUSH: CPT

## 2020-01-01 RX ORDER — POLYETHYLENE GLYCOL 3350 17 G/17G
1 POWDER, FOR SOLUTION ORAL DAILY
COMMUNITY

## 2020-01-01 RX ORDER — TRAMADOL HYDROCHLORIDE 50 MG/1
50 TABLET ORAL EVERY 6 HOURS PRN
COMMUNITY

## 2020-01-01 RX ORDER — SODIUM CHLORIDE 9 MG/ML
INJECTION, SOLUTION INTRAVENOUS CONTINUOUS
Status: DISCONTINUED | OUTPATIENT
Start: 2020-01-01 | End: 2020-01-01 | Stop reason: HOSPADM

## 2020-01-01 RX ORDER — MORPHINE SULFATE 2 MG/ML
2 INJECTION, SOLUTION INTRAMUSCULAR; INTRAVENOUS ONCE
Status: COMPLETED | OUTPATIENT
Start: 2020-01-01 | End: 2020-01-01

## 2020-01-01 RX ORDER — ASPIRIN 81 MG/1
81 TABLET, CHEWABLE ORAL DAILY
COMMUNITY

## 2020-01-01 RX ORDER — IOPAMIDOL 755 MG/ML
50 INJECTION, SOLUTION INTRAVASCULAR ONCE
Status: COMPLETED | OUTPATIENT
Start: 2020-01-01 | End: 2020-01-01

## 2020-01-01 RX ORDER — AMLODIPINE BESYLATE 2.5 MG/1
2.5 TABLET ORAL DAILY
COMMUNITY

## 2020-01-01 RX ORDER — ATORVASTATIN CALCIUM 10 MG/1
10 TABLET, FILM COATED ORAL DAILY
COMMUNITY

## 2020-01-01 RX ORDER — NICOTINE POLACRILEX 4 MG
15-30 LOZENGE BUCCAL
Status: DISCONTINUED | OUTPATIENT
Start: 2020-01-01 | End: 2020-01-01

## 2020-01-01 RX ORDER — HEPARIN SODIUM 1000 [USP'U]/ML
5000 INJECTION, SOLUTION INTRAVENOUS; SUBCUTANEOUS ONCE
Status: COMPLETED | OUTPATIENT
Start: 2020-01-01 | End: 2020-01-01

## 2020-01-01 RX ORDER — LIDOCAINE 40 MG/G
CREAM TOPICAL
Status: DISCONTINUED | OUTPATIENT
Start: 2020-01-01 | End: 2020-01-01 | Stop reason: HOSPADM

## 2020-01-01 RX ORDER — ASPIRIN 81 MG/1
324 TABLET, CHEWABLE ORAL ONCE
Status: COMPLETED | OUTPATIENT
Start: 2020-01-01 | End: 2020-01-01

## 2020-01-01 RX ORDER — ACETAMINOPHEN 500 MG
500-1000 TABLET ORAL EVERY 6 HOURS PRN
COMMUNITY

## 2020-01-01 RX ORDER — NICOTINE POLACRILEX 4 MG
15-30 LOZENGE BUCCAL
Status: DISCONTINUED | OUTPATIENT
Start: 2020-01-01 | End: 2020-01-01 | Stop reason: HOSPADM

## 2020-01-01 RX ORDER — HEPARIN SODIUM 10000 [USP'U]/100ML
0-3500 INJECTION, SOLUTION INTRAVENOUS CONTINUOUS
Status: DISCONTINUED | OUTPATIENT
Start: 2020-01-01 | End: 2020-01-01 | Stop reason: HOSPADM

## 2020-01-01 RX ORDER — AMOXICILLIN 500 MG/1
2000 CAPSULE ORAL SEE ADMIN INSTRUCTIONS
COMMUNITY

## 2020-01-01 RX ORDER — DEXTROSE MONOHYDRATE 25 G/50ML
25-50 INJECTION, SOLUTION INTRAVENOUS
Status: DISCONTINUED | OUTPATIENT
Start: 2020-01-01 | End: 2020-01-01

## 2020-01-01 RX ORDER — LATANOPROST 50 UG/ML
1 SOLUTION/ DROPS OPHTHALMIC AT BEDTIME
COMMUNITY

## 2020-01-01 RX ORDER — LIDOCAINE HYDROCHLORIDE 10 MG/ML
INJECTION, SOLUTION EPIDURAL; INFILTRATION; INTRACAUDAL; PERINEURAL
Status: COMPLETED
Start: 2020-01-01 | End: 2020-01-01

## 2020-01-01 RX ORDER — DEXTROSE MONOHYDRATE 25 G/50ML
25-50 INJECTION, SOLUTION INTRAVENOUS
Status: DISCONTINUED | OUTPATIENT
Start: 2020-01-01 | End: 2020-01-01 | Stop reason: HOSPADM

## 2020-01-01 RX ORDER — HEPARIN SODIUM 1000 [USP'U]/ML
INJECTION, SOLUTION INTRAVENOUS; SUBCUTANEOUS
Status: DISCONTINUED
Start: 2020-01-01 | End: 2020-01-01 | Stop reason: HOSPADM

## 2020-01-01 RX ORDER — ALBUMIN (HUMAN) 12.5 G/50ML
12.5 SOLUTION INTRAVENOUS ONCE
Status: DISCONTINUED | OUTPATIENT
Start: 2020-01-01 | End: 2020-01-01 | Stop reason: HOSPADM

## 2020-01-01 RX ORDER — DOCUSATE SODIUM 100 MG/1
100 CAPSULE, LIQUID FILLED ORAL 2 TIMES DAILY
COMMUNITY

## 2020-01-01 RX ADMIN — SODIUM CHLORIDE 8 MG/HR: 9 INJECTION, SOLUTION INTRAVENOUS at 19:53

## 2020-01-01 RX ADMIN — IOPAMIDOL 50 ML: 755 INJECTION, SOLUTION INTRAVENOUS at 20:19

## 2020-01-01 RX ADMIN — IOHEXOL 75 ML: 350 INJECTION, SOLUTION INTRAVENOUS at 03:08

## 2020-01-01 RX ADMIN — LIDOCAINE HYDROCHLORIDE 10 ML: 10 INJECTION, SOLUTION EPIDURAL; INFILTRATION; INTRACAUDAL; PERINEURAL at 14:34

## 2020-01-01 RX ADMIN — IOPAMIDOL 50 ML: 755 INJECTION, SOLUTION INTRAVENOUS at 17:40

## 2020-01-01 RX ADMIN — HEPARIN SODIUM 5000 UNITS: 1000 INJECTION, SOLUTION INTRAVENOUS; SUBCUTANEOUS at 05:11

## 2020-01-01 RX ADMIN — MORPHINE SULFATE 2 MG: 2 INJECTION, SOLUTION INTRAMUSCULAR; INTRAVENOUS at 18:32

## 2020-01-01 RX ADMIN — ASPIRIN 81 MG 324 MG: 81 TABLET ORAL at 04:55

## 2020-01-01 RX ADMIN — PHYTONADIONE 5 MG: 10 INJECTION, EMULSION INTRAMUSCULAR; INTRAVENOUS; SUBCUTANEOUS at 18:26

## 2020-01-01 RX ADMIN — SODIUM CHLORIDE 500 ML: 9 INJECTION, SOLUTION INTRAVENOUS at 18:25

## 2020-01-01 RX ADMIN — HEPARIN SODIUM 1015 UNITS/HR: 10000 INJECTION, SOLUTION INTRAVENOUS at 05:12

## 2020-01-01 RX ADMIN — PANTOPRAZOLE SODIUM 40 MG: 40 INJECTION, POWDER, FOR SOLUTION INTRAVENOUS at 19:08

## 2020-01-01 RX ADMIN — SODIUM CHLORIDE 500 ML: 9 INJECTION, SOLUTION INTRAVENOUS at 20:22

## 2020-01-01 ASSESSMENT — ENCOUNTER SYMPTOMS
FEVER: 0
HEADACHES: 0
DIFFICULTY URINATING: 0
COLOR CHANGE: 0
FEVER: 0
DIFFICULTY URINATING: 0
SHORTNESS OF BREATH: 0
FATIGUE: 1
ARTHRALGIAS: 0
ABDOMINAL PAIN: 0
CONFUSION: 0
SHORTNESS OF BREATH: 0
ABDOMINAL PAIN: 0
EYE REDNESS: 0
WEAKNESS: 1
CONFUSION: 0
NECK STIFFNESS: 0
NECK STIFFNESS: 0
COLOR CHANGE: 0
ARTHRALGIAS: 0
HEADACHES: 0
EYE REDNESS: 0

## 2020-01-27 NOTE — ED TRIAGE NOTES
Pt was lying in bed this sahil when her L leg became pale and numb from the knee down.  Pt reports some sensation has returned and color is improved although this writer is unable to appreciate a pedal pulse on the left foot with the doppler.  R DP pulse present.  BIB [facility].  Daughter here.

## 2020-01-27 NOTE — ED PROVIDER NOTES
"  History     Chief Complaint   Patient presents with     Numbness     LLE below the knee.     HPI  Shawanda Luis is a 79 year old female who presents to the emergency department from nursing home.  Patient was in bed when he started feeling \"funny\" and noted that the color of the left leg was turning bluish with associated numbness.  This episode lasted for several minutes and by the time he got to the emergency department the tunnel of the left lower extremity was back to normal.  She denied any chest pain, headache, neck pain, unilateral body weakness.  No orthopnea or paroxysmal nocturnal dyspnea.  No leg edema or prolonged immobility.    Allergies:  Allergies   Allergen Reactions     Hazelnuts [Nuts] Swelling     Pt states that they had mild tongue swelling.        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       Closed fibular fracture      Priority: Medium     distal right       CAD (coronary artery disease)      Priority: Medium     significant 2 vessel       Essential hypertension      Priority: Medium     Hypothyroidism      Priority: Medium     Hyperlipidemia      Priority: Medium        Past Medical History:    Past Medical History:   Diagnosis Date     Acid reflux      Acute blood loss anemia      CAD (coronary artery disease)      Closed fibular fracture      Essential hypertension      GI bleed      Heart murmur      History of blood transfusion      Hyperlipidemia      Hypothyroidism      Severe aortic stenosis      Syncope      " Thyroid disease        Past Surgical History:    Past Surgical History:   Procedure Laterality Date     BYPASS GRAFT ARTERY CORONARY, REPLACE VALVE AORTIC, COMBINED N/A 12/18/2014    Procedure: COMBINED BYPASS GRAFT ARTERY CORONARY, REPLACE VALVE AORTIC;  Surgeon: Butch English MD;  Location: [facility] OR     ENT SURGERY       EYE SURGERY       PHACOEMULSIFICATION WITH STANDARD INTRAOCULAR LENS IMPLANT  4/8/2014    Procedure: PHACOEMULSIFICATION WITH STANDARD INTRAOCULAR LENS IMPLANT;  CATARACT EXTRACTION WITH INTRA OCULAR LENS LEFT;  Surgeon: Arpit Albright MD;  Location: [facility]       Family History:    No family history on file.    Social History:  Marital Status:   [4]  Social History     Tobacco Use     Smoking status: Former Smoker   Substance Use Topics     Alcohol use: No     Drug use: No        Medications:    amoxicillin (AMOXIL) 500 MG capsule  aspirin  MG EC tablet  ATORVASTATIN CALCIUM PO  Cyanocobalamin (VITAMIN B12 PO)  ferrous sulfate 325 (65 FE) MG tablet  latanoprost (XALATAN) 0.005 % ophthalmic solution  Levothyroxine Sodium (SYNTHROID PO)  LISINOPRIL PO  metoprolol (LOPRESSOR) 25 MG tablet  order for DME  oxyCODONE (ROXICODONE) 5 MG immediate release tablet  senna-docusate (SENOKOT-S;PERICOLACE) 8.6-50 MG per tablet  triamterene-hydrochlorothiazide (MAXZIDE-25) 37.5-25 MG per tablet          Review of Systems   All other systems reviewed and are negative.      Physical Exam   BP: (!) 183/68  Pulse: 82  Heart Rate: 87  Temp: 99.1  F (37.3  C)  Resp: 20  Weight: 56.4 kg (124 lb 5.4 oz)  SpO2: 92 %      Physical Exam  Vitals signs and nursing note reviewed.   Constitutional:       General: She is not in acute distress.     Appearance: Normal appearance. She is not diaphoretic.   HENT:      Head: Normocephalic and atraumatic.      Mouth/Throat:      Pharynx: No oropharyngeal exudate.   Eyes:      General: No scleral icterus.     Pupils: Pupils are equal, round, and reactive to light.    Cardiovascular:      Heart sounds: Normal heart sounds.   Pulmonary:      Effort: No respiratory distress.      Breath sounds: Normal breath sounds.   Abdominal:      General: Bowel sounds are normal.      Palpations: Abdomen is soft.      Tenderness: There is no abdominal tenderness.   Musculoskeletal:         General: No tenderness.   Skin:     General: Skin is warm.      Findings: No rash.   Neurological:      Mental Status: She is alert.         ED Course   Evaluated upon arrival at the ED and laboratory two-sided including CT chest abdomen pelvis and CT head.  4:45 AM-started on heparin bolus and heparin infusion.  Aspirin 324 mg oral given.     Procedures      Results for orders placed or performed during the hospital encounter of 01/27/20 (from the past 24 hour(s))   CBC with platelets differential   Result Value Ref [place]    WBC 11.1 (H) 4.0 - 11.0 10e9/L    RBC Count 3.94 3.8 - 5.2 10e12/L    Hemoglobin 10.6 (L) 11.7 - 15.7 g/dL    Hematocrit 31.5 (L) 35.0 - 47.0 %    MCV 80 78 - 100 fl    MCH 26.9 26.5 - 33.0 pg    MCHC 33.7 31.5 - 36.5 g/dL    RDW 14.1 10.0 - 15.0 %    Platelet Count 611 (H) 150 - 450 10e9/L    Diff Method Automated Method     % Neutrophils 89.7 %    % Lymphocytes 2.9 %    % Monocytes 6.1 %    % Eosinophils 0.2 %    % Basophils 0.4 %    % Immature Granulocytes 0.7 %    Nucleated RBCs 0 0 /100    Absolute Neutrophil 10.0 (H) 1.6 - 8.3 10e9/L    Absolute Lymphocytes 0.3 (L) 0.8 - 5.3 10e9/L    Absolute Monocytes 0.7 0.0 - 1.3 10e9/L    Absolute Eosinophils 0.0 0.0 - 0.7 10e9/L    Absolute Basophils 0.1 0.0 - 0.2 10e9/L    Abs Immature Granulocytes 0.1 0 - 0.4 10e9/L    Absolute Nucleated RBC 0.0    Comprehensive metabolic panel   Result Value Ref [place]    Sodium 127 (L) 133 - 144 mmol/L    Potassium 3.6 3.4 - 5.3 mmol/L    Chloride 90 (L) 94 - 109 mmol/L    Carbon Dioxide 26 20 - 32 mmol/L    Anion Gap 11 3 - 14 mmol/L    Glucose 137 (H) 70 - 99 mg/dL    Urea Nitrogen 24 7 - 30 mg/dL     Creatinine 1.42 (H) 0.52 - 1.04 mg/dL    GFR Estimate 35 (L) >60 mL/min/[1.73_m2]    GFR Estimate If Black 41 (L) >60 mL/min/[1.73_m2]    Calcium 8.5 8.5 - 10.1 mg/dL    Bilirubin Total 0.5 0.2 - 1.3 mg/dL    Albumin 2.2 (L) 3.4 - 5.0 g/dL    Protein Total 7.2 6.8 - 8.8 g/dL    Alkaline Phosphatase 47 40 - 150 U/L    ALT 10 0 - 50 U/L    AST 20 0 - 45 U/L   Lactic acid whole blood   Result Value Ref [place]    Lactic Acid 1.5 0.7 - 2.0 mmol/L   Troponin I   Result Value Ref [place]    Troponin I ES <0.015 0.000 - 0.045 ug/L   Erythrocyte sedimentation rate auto   Result Value Ref [place]    Sed Rate 97 (H) 0 - 30 mm/h   CRP inflammation   Result Value Ref [place]    CRP Inflammation 127.0 (H) 0.0 - 8.0 mg/L   UA reflex to Microscopic and Culture   Result Value Ref [place]    Color Urine Yellow     Appearance Urine Slightly Cloudy     Glucose Urine Negative NEG^Negative mg/dL    Bilirubin Urine Negative NEG^Negative    Ketones Urine 10 (A) NEG^Negative mg/dL    Specific Gravity Urine 1.017 1.003 - 1.035    Blood Urine Trace (A) NEG^Negative    pH Urine 5.0 4.7 - 8.0 pH    Protein Albumin Urine 30 (A) NEG^Negative mg/dL    Urobilinogen mg/dL 2.0 0.0 - 2.0 mg/dL    Nitrite Urine Negative NEG^Negative    Leukocyte Esterase Urine Moderate (A) NEG^Negative    Source Midstream Urine     RBC Urine 2 0 - 2 /HPF    WBC Urine 6 (H) 0 - 5 /HPF    Bacteria Urine None (A) NEG^Negative /HPF    Squamous Epithelial /HPF Urine 1 0 - 1 /HPF    Mucous Urine Present (A) NEG^Negative /LPF    Hyaline Casts 11 (A) OTO2^O - 2 /LPF       Medications   sodium chloride 0.9% infusion (has no administration in time range)   heparin  drip 25,000 units in 0.45% NaCl 250 mL  ANTICOAGULANT  (see additional administration details for dose) (1,015 Units/hr Intravenous New Bag 1/27/20 0512)   heparin bolus from infusion pump (has no administration in time range)   iohexol (OMNIPAQUE) 350 mg/mL solution 75 mL (75 mLs Intravenous Given 1/27/20 6990)   sodium  chloride (PF) 0.9% PF flush 100 mL (100 mLs Intravenous Given 1/27/20 0820)   aspirin (ASA) chewable tablet 324 mg (324 mg Oral Given 1/27/20 5577)   heparin (porcine) injection 5,000 Units (5,000 Units Intravenous Given 1/27/20 6065)       Assessments & Plan (with Medical Decision Making)   Peripheral vascular disease: Patient presented to the ED with complaints of numbness and cyanotic left lower extremity.  Exam showed absent dorsal pedal and posterior tibial pulses.  CTA chest abdomen pelvis showed occlusion of the left common iliac and left common femoral arteries with distal reconstitution and atherosclerotic changes.  Aspirin given, heparin bolus and infusion started.  Dr. Schwab of [facility] in [place] consulted in turn consulted the vascular surgeon and they recommended that patient be transferred to the hospital.    Bilateral adnexal masses: CT scan also showed bilateral adnexal masses larger on the right with large amount of ascites and peritoneal mass and nodular thickening suggestive of carcinomatosis.  Patient is not aware of any diagnosis of abdominal or pelvic cancers and this is new to her.  I will defer further evaluation of these masses to [facility] where patient is being transferred.  Transferred via ALS ambulance.    I have reviewed the nursing notes.    I have reviewed the findings, diagnosis, plan and need for follow up with the patient.      New Prescriptions    No medications on file       Final diagnoses:   Peripheral vascular occlusive disease ([facility])   Adnexal mass       1/27/2020   [facility]     Dain Neal MD  01/27/20 0593

## 2020-01-27 NOTE — DISCHARGE INSTRUCTIONS
What to expect when you have contrast    During your exam, we will inject  contrast  into your vein or artery. (Contrast is a clear liquid with iodine in it. It shows up on X-rays.)    You may feel warm or hot. You may have a metal taste in your mouth and a slight upset stomach. You may also feel pressure near the kidneys and bladder. These effects will last about 1 to 3 minutes.    Please tell us if you have:    Sneezing     Itching    Hives     Swelling in the face    A hoarse voice    Breathing problems    Other new symptoms    Serious problems are rare.  They may include:    Irregular heartbeat     Seizures    Kidney failure              Tissue damage    Shock      Death    If you have any problems during the exam, we  will treat them right away.    When you get home    Call your hospital if you have any new symptoms in the next 2 days, like hives or swelling. (Phone numbers are at the bottom of this page.) Or call your family doctor.     If you have wheezing or trouble breathing, call 911.    Self-care  -Drink at least 4 extra glasses of water today.   This reduces the stress on your kidneys.  -Keep taking your regular medicines.    The contrast will pass out of your body in your  Urine(pee). This will happen in the next 24 hours. You  will not feel this. Your urine will not  change color.    If you have kidney problems or take metformin    Drink 4 to 8 large glasses of water for the next  2 days, if you are not on a fluid restriction.    ?If you take metformin (Glucophage or Glucovance) for diabetes, keep taking it.      ?Your kidney function tests are abnormal.  If you take Metformin, do not take it for 48 hours. Please go to your clinic for a blood test within 3 days after your exam before the restarting this medicine.     (Note to provider:please give patient prescription for lab tests.)    ?Special instructions: -    I have read and understand the above information.    Patient Sign  Here:______________________________________Date:________Time:______    Staff Sign Here:________________________________________Date:_______Time:______      Radiology Departments:     ?[facility]: [phone] ?[place]: [phone]     ?[place]: [phone] ?[place]:[phone]      ?[place]: [phone]  ?[place]: [phone]  ?[place]:[phone]    ?[facility]:[phone]  ?[facility]:[phone]

## 2020-02-06 NOTE — IP AVS SNAPSHOT
"                  MRN:[id number]                      After Visit Summary   2/6/2020    Shawanda Luis    MRN: [id number]           Visit Information        Provider Department      2/6/2020  1:40 PM [facility]; [facility]; [facility]           Review of your medicines      Notice    This visit is during an admission. Changes to the med list made in this visit will be reflected in the After Visit Summary of the admission.           Protect others around you: Learn how to safely use, store and throw away your medicines at www.disposemymeds.org.       Follow-ups after your visit       Future tests that were ordered for you     Cytology non gyn      Specimen Source:  Abdominal ascites           Care Instructions       Further instructions from your care team           DISCHARGE INSTRUCTIONS  Paracentesis      IMPORTANT: As you prepare for discharge, the following information will help you return to your best level of health.       This Information Is About Your Follow Up Care  Call your doctor if you do not get better. Call sooner if you feel worse. You can reach your doctor by calling their clinic phone number.                                    This Information Is About Procedures      PARACENTESIS  This procedure involved the insertion of a needle into the space between your abdominal organs and the membrane that surrounds them (peritoneal space). It was done to get a sample of the fluid or to remove excess fluid.    Call your doctor if you have:    any drainage from the procedure site.    any redness, swelling, pain or pus around the procedure site.    swelling in your scrotum.    any new or severe symptoms.                        This Information Is About Your Illness and Diagnosis    ASCITES    Ascites is extra fluid in your abdomen. The lining inside your abdomen is called the \"peritoneum\". The peritoneum is made up of two thin layers. One layer is attached to the wall of the abdomen. The other layer is " "wrapped around the abdominal organs.  These layers usually slide right next to each other, without extra fluid. With ascites, fluid builds up in the space between these layers.    Causes of ascites may include:    Cirrhosis of the liver (most common cause)    A blood clot in the vein of the liver    Congestive heart failure    Inflammation of the sac around the heart    Chronic hepatitis    Pancreatitis    Liver cancer    Nephrotic syndrome    Poor nutrition    Cancer in the abdomen    Chronic hemodialysis    Infection in the abdomen    When you go home, follow these instructions:    Take your medications as scheduled.    Follow a low sodium diet if it is ordered for you.     Try using herbs and other flavorings, like lemon juice, to season your food.    Avoid canned and prepackaged foods.    Do not add salt to any of your food, either while cooking or while eating.    Avoid salty snacks like chips, nuts, and crackers.  Try fresh fruit or vegetables instead.    Follow instructions given to you by your dietician at the hospital.    Sleep with your head elevated (on several pillows or with wood blocks under the head of the bed) if breathing is hard for you when lying flat.    Eat small frequent meals if you get full easily.    Keep follow up appointments with your doctor.     Call your doctor if:    You notice your clothes are getting tighter around the waist.    Your weight is increasing.    You are more short of breath than usual.    You are having more discomfort in your abdomen.    You have trouble following your diet.    You have any other questions or concerns.                                  Additional Information About Your Visit       MyChart Information    [facility] lets you send messages to your doctor, view your test results, renew your prescriptions, schedule appointments and more. To sign up, go to [email or web address] . Click on \"Log in\" on the left side of the screen, which will take you to the " "Welcome page. Then click on \"Sign up Now\" on the right side of the page.     You will be asked to enter the access code listed below, as well as some personal information. Please follow the directions to create your username and password.     Your access code is: [email or web address]  Expires: 2020  2:23 PM     Your access code will  in 60 days. If you need help or a new code, please call your [facility] clinic or [phone].       Care EveryWhere ID    This is your Care EveryWhere ID. This could be used by other organizations to access your [facility] medical records  [id number]       Your Vitals Were  Most recent update: 2020  2:21 PM    Blood Pressure   147/65      Pulse   86    Respirations   16    Pulse Oximetry   90%           Primary Care Provider Office Phone # Fax #    Denton Baugh [phone]      Equal Access to Services    [facility]: Hadii paul benjamin hadasho Sodayamiali, waaxda luqadaha, qaybta kaalmada adeegyada, abdirashid butt . So [facility] [phone].    ATENCIÓN: Si habla español, tiene a al disposición servicios gratuitos de asistencia lingüística. Llame al [phone].    We comply with applicable federal and state civil rights laws, including the [place] Human Rights Act. We do not discriminate on the basis of race, color, creed, Confucianism, national origin, marital status, age, disability, sex, sexual orientation, or gender identity.       Thank you!    Thank you for choosing [facility] for your care. Our goal is always to provide you with excellent care. Hearing back from our patients is one way we can continue to improve our services. Please take a few minutes to complete the written survey that you may receive in the mail after you visit with us. Thank you!         Medication List     Notice    This visit is during an admission. Changes to the med list made in this visit will be reflected in the After Visit Summary of the admission.           "

## 2020-02-06 NOTE — IP AVS SNAPSHOT
[facility]  [address] [place] [address]  Phone:  [phone]                                    After Visit Summary   2/6/2020    Shawanda Luis    MRN: [id number]           After Visit Summary Signature Page    I have received my discharge instructions, and my questions have been answered. I have discussed any challenges I see with this plan with the nurse or doctor.    ..........................................................................................................................................  Patient/Patient Representative Signature      ..........................................................................................................................................  Patient Representative Print Name and Relationship to Patient    ..................................................               ................................................  Date                                   Time    ..........................................................................................................................................  Reviewed by Signature/Title    ...................................................              ..............................................  Date                                               Time          22EPIC Rev 08/18

## 2020-02-06 NOTE — PROGRESS NOTES
Procedure: US Paracentesis, NA    There were  no complications and patient has no symptoms..      Tolerated procedure well.    Patient to US for Paracentesis.  Positioned supine on table.  Draining dark andrew fluid.  Fluid to be sent to lab. 4000 ml total fld drained.   Pt tolerated the procedure well.      Discharge instructions given to pt.    Radiologist:Dr. Fontenot    Time Out: Prior to the start of the procedure and with procedural staff participation, I verbally confirmed the patient s identity using two indicators, relevant allergies, that the procedure was appropriate and matched the consent or emergent situation, and that the correct equipment/implants were available. Immediately prior to starting the procedure I conducted the Time Out with the procedural staff and re-confirmed the patient s name, procedure, and site/side. (The Joint Commission universal protocol was followed.)  Yes    Position:  supine    Pain:  0    Sedation:None. Local Anesthestic used  No sedation    Estimated Blood Loss: None     Condition: Stable    Comments: See dictated procedure note for full details     Thalia Reyes

## 2020-02-23 NOTE — ED AVS SNAPSHOT
[facility]  [address] [address]  Phone:  [phone]                                    Shawanda Luis   MRN: [id number]    Department:  [facility]   Date of Visit:  2/23/2020           After Visit Summary Signature Page    I have received my discharge instructions, and my questions have been answered. I have discussed any challenges I see with this plan with the nurse or doctor.    ..........................................................................................................................................  Patient/Patient Representative Signature      ..........................................................................................................................................  Patient Representative Print Name and Relationship to Patient    ..................................................               ................................................  Date                                   Time    ..........................................................................................................................................  Reviewed by Signature/Title    ...................................................              ..............................................  Date                                               Time          22EPIC Rev 08/18

## 2020-02-24 NOTE — ED NOTES
"Per family they brought her in tonight as she states having \"something is going on with my left foot\". Per family she had a blood clot in her left leg about 4 weeks ago. States that when she would sit and have her feet down that the ankle would get swollen and when they put it up they state it would turn white and be very cold. The left foot does feel cooler then the right and there is swelling around the ankle. She states that she can't feel that left foot even when I touch her foot she states that she can't feel me touch it.  "

## 2020-02-24 NOTE — ED NOTES
Patient and family given written and verbal discharge instructions and both verbalize understanding. Patient left via wheelchair with family.

## 2020-02-24 NOTE — ED PROVIDER NOTES
History     Chief Complaint   Patient presents with     Leg Pain     HPI  Shawanda Luis is a 79 year old female who presented to the ED accompanied by the daughter and granddaughter.  Patient started experiencing weakness in the left lower extremity around 4 PM.  Now the leg is so weak and is cold and is not able to ambulate properly.  Denies any facial droop, slurring of speech, or head trauma.  She is currently on Eliquis for the last 5 days.  She was recently diagnosed with metastatic ovarian cancer and is on palliative care.    Allergies:  Allergies   Allergen Reactions     Hazelnuts [Nuts] Swelling     Pt states that they had mild tongue swelling.        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       Closed fibular fracture      Priority: Medium     distal right       CAD (coronary artery disease)      Priority: Medium     significant 2 vessel       Essential hypertension      Priority: Medium     Hypothyroidism      Priority: Medium     Hyperlipidemia      Priority: Medium        Past Medical History:    Past Medical History:   Diagnosis Date     Acid reflux      Acute blood loss anemia      CAD (coronary artery disease)      Closed fibular fracture      Essential hypertension      GI bleed      Heart murmur      History of blood transfusion      Hyperlipidemia      Hypothyroidism      Severe aortic stenosis      Syncope      Thyroid disease        Past Surgical History:    Past Surgical History:   Procedure Laterality Date     BYPASS  GRAFT ARTERY CORONARY, REPLACE VALVE AORTIC, COMBINED N/A 12/18/2014    Procedure: COMBINED BYPASS GRAFT ARTERY CORONARY, REPLACE VALVE AORTIC;  Surgeon: Butch English MD;  Location: [facility] OR     COLONOSCOPY - HIM SCAN  10/03/2014    Colonoscopy with 3 small AVM's at the IC valve Cauterized, 1.2 cm flat ascending colon polyp removed with 3 clips placed, single 6 mm transverse colon polyp removed with a single clip placed, few sigmoid diverticula, normal terminal ileum to 15 cm, normal rectum Dr Aki Montoya at [facility] in [place] on 10/3/2014     ENT SURGERY       EYE SURGERY       PHACOEMULSIFICATION WITH STANDARD INTRAOCULAR LENS IMPLANT  4/8/2014    Procedure: PHACOEMULSIFICATION WITH STANDARD INTRAOCULAR LENS IMPLANT;  CATARACT EXTRACTION WITH INTRA OCULAR LENS LEFT;  Surgeon: Arpit Albright MD;  Location: [facility] OR       Family History:    No family history on file.    Social History:  Marital Status:   [4]  Social History     Tobacco Use     Smoking status: Former Smoker   Substance Use Topics     Alcohol use: No     Drug use: No        Medications:    acetaminophen (TYLENOL) 500 MG tablet  amLODIPine (NORVASC) 2.5 MG tablet  amoxicillin (AMOXIL) 500 MG capsule  aspirin (ASA) 81 MG chewable tablet  atorvastatin (LIPITOR) 10 MG tablet  docusate sodium (COLACE) 100 MG capsule  enoxaparin ANTICOAGULANT (LOVENOX) 30 MG/0.3ML syringe  latanoprost (XALATAN) 0.005 % ophthalmic solution  Levothyroxine Sodium (SYNTHROID PO)  order for DME  polyethylene glycol (MIRALAX/GLYCOLAX) packet  traMADol (ULTRAM) 50 MG tablet          Review of Systems   Constitutional: Negative for fever.   HENT: Negative for congestion.    Eyes: Negative for redness.   Respiratory: Negative for shortness of breath.    Cardiovascular: Negative for chest pain.   Gastrointestinal: Negative for abdominal pain.   Genitourinary: Negative for difficulty urinating.   Musculoskeletal: Negative for arthralgias and neck stiffness.   Skin: Negative for  color change.   Neurological: Positive for weakness. Negative for headaches.   Psychiatric/Behavioral: Negative for confusion.   All other systems reviewed and are negative.      Physical Exam   BP: 115/57  Heart Rate: 96  Temp: 97.9  F (36.6  C)  Resp: 16  Weight: 52.6 kg (116 lb)  SpO2: 95 %      Physical Exam  Vitals signs and nursing note reviewed.   Constitutional:       General: She is not in acute distress.     Appearance: She is not diaphoretic.   HENT:      Head: Normocephalic and atraumatic.      Mouth/Throat:      Pharynx: No oropharyngeal exudate.   Eyes:      General: No scleral icterus.     Pupils: Pupils are equal, round, and reactive to light.   Cardiovascular:      Heart sounds: Normal heart sounds.   Pulmonary:      Effort: No respiratory distress.      Breath sounds: Normal breath sounds.   Abdominal:      General: Bowel sounds are normal.      Palpations: Abdomen is soft.      Tenderness: There is no abdominal tenderness.   Musculoskeletal:         General: No tenderness.   Skin:     General: Skin is warm.      Findings: No rash.   Neurological:      Mental Status: Mental status is at baseline.      Cranial Nerves: No cranial nerve deficit.      Comments: Power grade 3/5 in the left lower extremity with foot drop.  Patient cannot dorsiflex on the left foot but plantarflexion is normal.  Muscle power, bulk, reflexes and tone are normal in the upper extremities and right lower extremity.         ED Course   Evaluated and code stroke called out.  7:40 PM-discussed with stroke neurologist at [facility] who recommended CTA and he will call back once he has reviewed them.    Procedures      Results for orders placed or performed during the hospital encounter of 02/23/20 (from the past 24 hour(s))   CBC with platelets differential   Result Value Ref [place]    WBC 9.0 4.0 - 11.0 10e9/L    RBC Count 3.95 3.8 - 5.2 10e12/L    Hemoglobin 10.1 (L) 11.7 - 15.7 g/dL    Hematocrit 32.1 (L) 35.0 -  47.0 %    MCV 81 78 - 100 fl    MCH 25.6 (L) 26.5 - 33.0 pg    MCHC 31.5 31.5 - 36.5 g/dL    RDW 16.9 (H) 10.0 - 15.0 %    Platelet Count 403 150 - 450 10e9/L    Diff Method Automated Method     % Neutrophils 83.2 %    % Lymphocytes 7.0 %    % Monocytes 7.3 %    % Eosinophils 0.9 %    % Basophils 1.0 %    % Immature Granulocytes 0.6 %    Nucleated RBCs 0 0 /100    Absolute Neutrophil 7.5 1.6 - 8.3 10e9/L    Absolute Lymphocytes 0.6 (L) 0.8 - 5.3 10e9/L    Absolute Monocytes 0.7 0.0 - 1.3 10e9/L    Absolute Eosinophils 0.1 0.0 - 0.7 10e9/L    Absolute Basophils 0.1 0.0 - 0.2 10e9/L    Abs Immature Granulocytes 0.1 0 - 0.4 10e9/L    Absolute Nucleated RBC 0.0    INR   Result Value Ref [place]    INR 2.33 (H) 0.86 - 1.14   Partial thromboplastin time   Result Value Ref [place]    PTT 56 (H) 22 - 37 sec   Troponin I   Result Value Ref [place]    Troponin I ES <0.015 0.000 - 0.045 ug/L   Comprehensive metabolic panel   Result Value Ref [place]    Sodium 132 (L) 133 - 144 mmol/L    Potassium 3.5 3.4 - 5.3 mmol/L    Chloride 95 94 - 109 mmol/L    Carbon Dioxide 28 20 - 32 mmol/L    Anion Gap 9 3 - 14 mmol/L    Glucose 107 (H) 70 - 99 mg/dL    Urea Nitrogen 13 7 - 30 mg/dL    Creatinine 0.71 0.52 - 1.04 mg/dL    GFR Estimate 82 >60 mL/min/[1.73_m2]    GFR Estimate If Black >90 >60 mL/min/[1.73_m2]    Calcium 8.3 (L) 8.5 - 10.1 mg/dL    Bilirubin Total 0.6 0.2 - 1.3 mg/dL    Albumin 1.8 (L) 3.4 - 5.0 g/dL    Protein Total 7.4 6.8 - 8.8 g/dL    Alkaline Phosphatase 55 40 - 150 U/L    ALT 9 0 - 50 U/L    AST 28 0 - 45 U/L   Glucose by meter   Result Value Ref [place]    Glucose 111 (H) 70 - 99 mg/dL       Medications   0.9% sodium chloride BOLUS (has no administration in time range)   iopamidol (ISOVUE-370) solution 50 mL (50 mLs Intravenous Given 2/23/20 2019)   sodium chloride (PF) 0.9% PF flush 100 mL (100 mLs Intravenous Given 2/23/20 2019)       Assessments & Plan (with Medical Decision Making)   Transferred care to [facility]  provider Dr Newton at 8 PM.    I have reviewed the nursing notes.    I have reviewed the findings, diagnosis, plan and need for follow up with the patient.  New Prescriptions    No medications on file       Final diagnoses:   Left foot drop       2/23/2020   [facility] EM[facility]     Dain Neal MD  02/23/20 2023

## 2020-02-24 NOTE — PROGRESS NOTES
Responded to code stroke. SPO2 on RA 88%,  Placed O2 at 2L. RR 18 and nonlabored. No further RT intervention needed at this time

## 2020-02-24 NOTE — DISCHARGE INSTRUCTIONS
Call the Neurology clinic at [facility] at [phone] tomorrow to schedule an appointment    Follow the aftercare instructions provided.     What to expect when you have contrast    During your exam, we will inject  contrast  into your vein or artery. (Contrast is a clear liquid with iodine in it. It shows up on X-rays.)    You may feel warm or hot. You may have a metal taste in your mouth and a slight upset stomach. You may also feel pressure near the kidneys and bladder. These effects will last about 1 to 3 minutes.    Please tell us if you have:   Sneezing    Itching   Hives    Swelling in the face   A hoarse voice   Breathing problems   Other new symptoms    Serious problems are rare.  They may include:   Irregular heartbeat    Seizures   Kidney failure             Tissue damage   Shock     Death    If you have any problems during the exam, we  will treat them right away.    When you get home    Call your hospital if you have any new symptoms in the next 2 days, like hives or swelling. (Phone numbers are at the bottom of this page.) Or call your family doctor.     If you have wheezing or trouble breathing, call 911.    Self-care  -Drink at least 4 extra glasses of water today.   This reduces the stress on your kidneys.  -Keep taking your regular medicines.    The contrast will pass out of your body in your  Urine(pee). This will happen in the next 24 hours. You  will not feel this. Your urine will not  change color.    If you have kidney problems or take metformin    Drink 4 to 8 large glasses of water for the next  2 days, if you are not on a fluid restriction.    ?If you take metformin (Glucophage or Glucovance) for diabetes, keep taking it.      ?Your kidney function tests are abnormal.  If you take Metformin, do not take it for 48 hours. Please go to your clinic for a blood test within 3 days after your exam before the restarting this medicine.     (Note to provider:please give patient prescription  for lab tests.)    ?Special instructions: -    I have read and understand the above information.    Patient Sign Here:______________________________________Date:________Time:______    Staff Sign Here:________________________________________Date:_______Time:______      Radiology Departments:     ?[facility]: [phone] ?[place]: [phone]     ?[place]: [phone] ?[facility]:[phone]      ?[place]: [phone]  ?[place]: [phone]  ?[place]:[phone]    ?[facility]:[phone]  ?[facility]:[phone]

## 2020-02-26 NOTE — ED PROVIDER NOTES
79 year old signed out to me pending CTs of head, neck and l-spine. CTs showed no acute findings. Case discussed with neurologist who felt foot drop sx unrelated to CNS pathology. Most likely a PNS problem that can be worked up as out patient.     Discussed with patient options including transfer to [facility] for workup. Patient declined. States she wants to go home. They will follow up with neurology for workup next week. Will return if sx. worsen.     Due to the nature of this electronic medical record, laboratory results, imaging results, diagnosis, other information and medications reported above may not represent information available to me at the the time of my care and disposition. Medications reported above may have not been ordered by me.     Portions of the record may have been created with voice recognition software. Occasional wrong-word or 'sound-a- like' substitution may have occurred due to the inherent limitations of voice recognition software. Though the chart has been reviewed, there may be inadvertent transcription errors. Read the chart carefully and recognize, using context, where substitutions have occurred.        Shaka Newton MD  02/26/20 2378

## 2020-03-07 NOTE — ED NOTES
DATE:  3/7/2020   TIME OF RECEIPT FROM LAB:  3951  LAB TEST:  Hgb  LAB VALUE:  6.5  RESULTS GIVEN WITH READ-BACK TO (PROVIDER):  Dain Neal MD  TIME LAB VALUE REPORTED TO PROVIDER:   1774

## 2020-03-07 NOTE — ED NOTES
EMS activated stroke alert in the field at 1702    Stroke alert was paged overhead at 1711    EMS arrived with patient 1710

## 2020-03-07 NOTE — ED PROVIDER NOTES
History     Chief Complaint   Patient presents with     Altered Mental Status     HPI  Shawanda Luis is a 79 year old female who presents to the emergency department via EMS.  EMS was called to the patient's apartment by the patient's daughters because she was more lethargic than usual since 11 AM.  She was complaining of weakness in the EMS team noted that patient was not tracking with both eyeballs laterally.  They also noted that the left pupil was more dilated than usual.  There was no facial droop, slurring of speech, unilateral body weakness.  Patient has stage IV lung cancer, peripheral vascular disease and recurrent pleural effusion.  Patient is not able to give any history just complaining that she is feeling tired.  All the history was obtained from EMS team since patient's relatives were not around at the time of history and physical.    Allergies:  Allergies   Allergen Reactions     Hazelnuts [Nuts] Swelling     Pt states that they had mild tongue swelling.        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       Closed fibular fracture      Priority: Medium     distal right       CAD (coronary artery disease)      Priority: Medium     significant 2 vessel       Essential hypertension      Priority: Medium     Hypothyroidism      Priority: Medium     Hyperlipidemia      Priority: Medium        Past Medical History:    Past Medical History:   Diagnosis Date     Acid reflux      Acute blood loss anemia      CAD (coronary artery disease)       Closed fibular fracture      Essential hypertension      GI bleed      Heart murmur      History of blood transfusion      Hyperlipidemia      Hypothyroidism      Severe aortic stenosis      Syncope      Thyroid disease        Past Surgical History:    Past Surgical History:   Procedure Laterality Date     BYPASS GRAFT ARTERY CORONARY, REPLACE VALVE AORTIC, COMBINED N/A 12/18/2014    Procedure: COMBINED BYPASS GRAFT ARTERY CORONARY, REPLACE VALVE AORTIC;  Surgeon: Butch English MD;  Location: [facility] OR     COLONOSCOPY - HIM SCAN  10/03/2014    Colonoscopy with 3 small AVM's at the IC valve Cauterized, 1.2 cm flat ascending colon polyp removed with 3 clips placed, single 6 mm transverse colon polyp removed with a single clip placed, few sigmoid diverticula, normal terminal ileum to 15 cm, normal rectum Dr Aki Montoya at [facility] in [place] on 10/3/2014     ENT SURGERY       EYE SURGERY       PHACOEMULSIFICATION WITH STANDARD INTRAOCULAR LENS IMPLANT  4/8/2014    Procedure: PHACOEMULSIFICATION WITH STANDARD INTRAOCULAR LENS IMPLANT;  CATARACT EXTRACTION WITH INTRA OCULAR LENS LEFT;  Surgeon: Arpit Albright MD;  Location: [facility] OR       Family History:    No family history on file.    Social History:  Marital Status:   [4]  Social History     Tobacco Use     Smoking status: Former Smoker   Substance Use Topics     Alcohol use: No     Drug use: No        Medications:    apixaban ANTICOAGULANT (ELIQUIS ANTICOAGULANT) 2.5 MG tablet  enoxaparin ANTICOAGULANT (LOVENOX) 30 MG/0.3ML syringe  acetaminophen (TYLENOL) 500 MG tablet  amLODIPine (NORVASC) 2.5 MG tablet  amoxicillin (AMOXIL) 500 MG capsule  aspirin (ASA) 81 MG chewable tablet  atorvastatin (LIPITOR) 10 MG tablet  docusate sodium (COLACE) 100 MG capsule  latanoprost (XALATAN) 0.005 % ophthalmic solution  Levothyroxine Sodium (SYNTHROID PO)  order for DME  polyethylene glycol (MIRALAX/GLYCOLAX) packet  traMADol (ULTRAM) 50 MG tablet          Review of Systems    Constitutional: Positive for fatigue. Negative for fever.   HENT: Negative for congestion.    Eyes: Negative for redness.   Respiratory: Negative for shortness of breath.    Cardiovascular: Negative for chest pain.   Gastrointestinal: Negative for abdominal pain.   Genitourinary: Negative for difficulty urinating.   Musculoskeletal: Negative for arthralgias and neck stiffness.   Skin: Negative for color change.   Neurological: Negative for headaches.   Psychiatric/Behavioral: Negative for confusion.   All other systems reviewed and are negative.      Physical Exam   BP: 106/73  Pulse: 99  Heart Rate: 100  Temp: 98.5  F (36.9  C)  Resp: 14  SpO2: (!) 90 %      Physical Exam  Vitals signs and nursing note reviewed.   Constitutional:       Appearance: She is underweight. She is not diaphoretic.   HENT:      Head: Atraumatic.      Mouth/Throat:      Pharynx: No oropharyngeal exudate.   Eyes:      General: No scleral icterus.     Pupils: Pupils are equal, round, and reactive to light.   Cardiovascular:      Heart sounds: Normal heart sounds.   Pulmonary:      Effort: No respiratory distress.      Breath sounds: Normal breath sounds.   Abdominal:      General: Bowel sounds are normal.      Palpations: Abdomen is soft.      Tenderness: There is no abdominal tenderness.   Musculoskeletal:         General: No tenderness.   Skin:     General: Skin is warm.      Findings: No rash.   Neurological:      Mental Status: She is lethargic.   Psychiatric:         Behavior: Behavior is cooperative.         ED Course   Patient evaluated upon arrival at the ED and code stroke called out.  Patient was wheeled to radiology department for CT scan prior stroke protocol.  Hemoglobin of 6.5, type and crossmatch done and transfused 3 units of packed RBCs.  INR of 6.27-vitamin K 5 mg IV given.  Stable vitals and therefore Kcentra not given.     Procedures       EKG Interpretation:      Interpreted by Dain Neal MD  Time reviewed: 5:50  PM  Symptoms at time of EKG: Lethargic  Rhythm: normal sinus   Rate: normal  Axis: Left axis deviation  Ectopy: none  Conduction: normal  ST Segments/ T Waves: No ST-T wave changes  Q Waves: none  Comparison to prior: No old EKG available    Clinical Impression: Normal sinus rhythm with left axis deviation        Results for orders placed or performed during the hospital encounter of 03/07/20 (from the past 24 hour(s))   Hepatic panel   Result Value Ref Range    Bilirubin Direct 0.7 (H) 0.0 - 0.2 mg/dL    Bilirubin Total 1.0 0.2 - 1.3 mg/dL    Albumin 1.5 (L) 3.4 - 5.0 g/dL    Protein Total 6.1 (L) 6.8 - 8.8 g/dL    Alkaline Phosphatase 58 40 - 150 U/L    ALT 34 0 - 50 U/L     (H) 0 - 45 U/L   Head CT w/o contrast    Narrative    PROCEDURE: CT HEAD W/O CONTRAST     HISTORY: Altered level of consciousness (LOC), unexplained.    COMPARISON: February 23, 2020    TECHNIQUE:  Helical images of the head from the foramen magnum to the  vertex were obtained without contrast.    FINDINGS: There is some mild enlargement of the cortical sulci and  ventricular system consistent with atrophy. There is white matter  low-density in both hemispheres consistent with small vessel disease.  There are no masses ventricular shifts or extracerebral collections.  The brainstem and cerebellum appear normal. The cranial vault is  intact.  There is mild mucosal thickening in the left sphenoid sinus.      Impression    IMPRESSION: No acute brain abnormality.      CAYLA FELIX MD   CBC with platelets differential   Result Value Ref Range    WBC 12.5 (H) 4.0 - 11.0 10e9/L    RBC Count 2.51 (L) 3.8 - 5.2 10e12/L    Hemoglobin 6.5 (LL) 11.7 - 15.7 g/dL    Hematocrit 20.6 (L) 35.0 - 47.0 %    MCV 82 78 - 100 fl    MCH 25.9 (L) 26.5 - 33.0 pg    MCHC 31.6 31.5 - 36.5 g/dL    RDW 19.9 (H) 10.0 - 15.0 %    Platelet Count 287 150 - 450 10e9/L    Diff Method Automated Method     % Neutrophils 88.5 %    % Lymphocytes 5.5 %    % Monocytes 5.0  %    % Eosinophils 0.0 %    % Basophils 0.1 %    % Immature Granulocytes 0.9 %    Nucleated RBCs 0 0 /100    Absolute Neutrophil 11.1 (H) 1.6 - 8.3 10e9/L    Absolute Lymphocytes 0.7 (L) 0.8 - 5.3 10e9/L    Absolute Monocytes 0.6 0.0 - 1.3 10e9/L    Absolute Eosinophils 0.0 0.0 - 0.7 10e9/L    Absolute Basophils 0.0 0.0 - 0.2 10e9/L    Abs Immature Granulocytes 0.1 0 - 0.4 10e9/L    Absolute Nucleated RBC 0.0    Basic metabolic panel   Result Value Ref Range    Sodium 132 (L) 133 - 144 mmol/L    Potassium 4.5 3.4 - 5.3 mmol/L    Chloride 96 94 - 109 mmol/L    Carbon Dioxide 21 20 - 32 mmol/L    Anion Gap 15 (H) 3 - 14 mmol/L    Glucose 128 (H) 70 - 99 mg/dL    Urea Nitrogen 29 7 - 30 mg/dL    Creatinine 1.39 (H) 0.52 - 1.04 mg/dL    GFR Estimate 36 (L) >60 mL/min/[1.73_m2]    GFR Estimate If Black 42 (L) >60 mL/min/[1.73_m2]    Calcium 7.8 (L) 8.5 - 10.1 mg/dL   INR   Result Value Ref Range    INR 6.27 (HH) 0.86 - 1.14   Partial thromboplastin time   Result Value Ref Range    PTT 61 (H) 22 - 37 sec   Troponin I   Result Value Ref Range    Troponin I ES 1.565 (HH) 0.000 - 0.045 ug/L   ABO/Rh type and screen   Result Value Ref Range    Units Ordered 3     ABO B     RH(D) Pos     Antibody Screen Neg     Test Valid Only At [facility]        Specimen Expires 03/10/2020     Crossmatch Red Blood Cells    Blood component   Result Value Ref Range    Unit Number [id number]     Blood Component Type Red Blood Cells Leukocyte Reduced     Division Number 00     Status of Unit Ready for patient 03/07/2020 1819     Blood Product Code [id number]     Unit Status ANA    Blood component   Result Value Ref Range    Unit Number [id number]     Blood Component Type Red Blood Cells Leukocyte Reduced     Division Number 00     Status of Unit Released to care unit 03/07/2020 1918     Blood Product Code [id number]     Unit Status ISS    Blood component   Result Value Ref Range    Unit Number [id number]     Blood Component  Type Red Blood Cells Leukocyte Reduced     Division Number 00     Status of Unit Ready for patient 03/07/2020 1831     Blood Product Code [id number]     Unit Status ANA    XR Chest Port 1 View    Narrative    PROCEDURE:  XR CHEST PORT 1 VW    HISTORY:  Lethargy.     COMPARISON:  2014    FINDINGS:   The heart is normal in size. Postoperative changes are seen in the  mediastinum. The pulmonary vasculature is normal.  The some abnormal  increase in density at both lung bases most likely representing  atelectasis however early pneumonia cannot be excluded. No pleural  effusion or pneumothorax.      Impression    IMPRESSION:  Bibasilar areas of increased density consistent with  atelectasis or pneumonia      CAYLA FELIX MD   CTA Head Neck with Contrast    Narrative    PROCEDURE: CTA  HEAD NECK WITH CONTRAST 3/7/2020 7:35 PM    HISTORY: Neuro deficit(s), subacute; Evaluate for  dissection/thromboembolism    COMPARISONS: None.    Meds/Dose Given: isovue 300; 100 ml    TECHNIQUE: CT angiogram of the neck and CT angiogram of the brain both  with three-dimensional MIPS reconstructions performed on a separate  workstation    FINDINGS: CT angiogram of the neck: The brachiocephalic artery is  widely patent. The proximal right common carotid artery is normal.  There is heavy plaquing at the right carotid bifurcation with an 80%  stenosis measured on today's examination. The exact degree of stenosis  is difficult to measure due to the presence of  dense vascular  calcifications. The presence of dense calcifications most likely  accounts for the difference in the degree of stenosis measured as  compared to previous examination on February 23, 2020.    There is some calcific plaquing at the origin of the left common  carotid artery. At the left carotid bifurcation heavy vascular  calcifications are noted with less than 50% stenosis is noted in the  proximal internal carotid artery. There is mild calcific plaquing in  the right  subclavian artery. The right vertebral artery is patent to  the skull base. Some calcific plaquing is seen in the proximal right  vertebral artery. The left vertebral artery arises directly from the  aorta with some calcific plaquing at its origin but less than 50%  stenosis.    CT angiogram of the brain: There is calcific plaquing in both carotid  siphons. The supraclinoid internal carotid arteries are widely patent  bilaterally. The anterior middle cerebral arteries are widely patent.  The distal vertebral basilar posterior cerebral arteries are widely  patent.    CT scan of the brain with IV contrast reveals no areas of pathologic  enhancement. There is some enlargement of the ventricular system and  cortical sulci consistent with atrophy.    There is some mucosal thickening seen in the left sphenoid sinus. The  muscles of mastication in the salivary glands are normal. The larynx  and upper trachea is normal. The thyroid gland is atrophic. The  cervical lymph nodes are normal in caliber. There are bilateral  pleural effusions noted in the lungs. There is questionable right  hilar lymphadenopathy that this was seen on the very first slice and  was incompletely evaluated. There is some increased density in the  left upper lobe focally. This may represent an area of atelectasis or  pneumonia however follow-up to clearing to exclude malignancy  recommended compressive atelectasis is seen in both lungs         Impression    IMPRESSION: 80% stenosis proximal internal carotid artery on the  right.    No intracerebral stenoses, occlusions or aneurysms.    Bilateral pleural effusions with some increased density in both lungs  consistent with atelectasis or pneumonia    CAYLA FELIX MD       Medications   HOLD: warfarin (COUMADIN) therapy (has no administration in time range)   pantoprazole (PROTONIX) 80 mg in sodium chloride 0.9 % 100 mL infusion (8 mg/hr Intravenous New Bag 3/7/20 1953)   0.9% sodium chloride BOLUS  (0 mLs Intravenous Stopped 3/7/20 1834)   sodium chloride (PF) 0.9% PF flush 100 mL (100 mLs Intravenous Given 3/7/20 1740)   iopamidol (ISOVUE-370) solution 50 mL (50 mLs Intravenous Given 3/7/20 1740)   phytonadione (AQUA-MEPHYTON) 5 mg in sodium chloride 0.9 % 50 mL intermittent infusion (5 mg Intravenous New Bag 3/7/20 1826)   morphine (PF) injection 2 mg (2 mg Intravenous Given 3/7/20 1832)   pantoprazole (PROTONIX) 40 mg IV push injection (40 mg Intravenous Given 3/7/20 1908)       Assessments & Plan (with Medical Decision Making)   Acute blood loss anemia: Acute blood loss anemia with hemoglobin of 6.5.  Started on transfusion of packed RBCs 3 units.  Started on IV Protonix bolus and infusion.  Will be transferred to [facility] in [place] Dr. Lionel winslow.  Transferred via ALS ambulance.  Supratherapeutic INR: Hold Coumadin.  IV vitamin K 5 mg given, hold Eliquis and aspirin.  Hold all blood pressure medications.  Keep patient n.p.o.  Elevated troponin: Troponin elevated to 1.5 most likely secondary to demand ischemia with severe anemia.  Will do serial troponins at [facility] in [place].  INR is supratherapeutic    I have reviewed the nursing notes.    I have reviewed the findings, diagnosis, plan and need for follow up with the patient.     [employer] Stroke Scale  Exam Interval: Baseline   Score    Level of consciousness: (1)   Not alert; arousable w/ minor stim to obey/answer/respond    LOC questions: (1)   Answers one question correctly    LOC commands: (1)   Performs one task correctly    Best gaze: (1)   Partial gaze palsy    Visual: (0)   No visual loss    Facial palsy: (0)   Normal symmetrical movements    Motor arm (left): (0)   No drift    Motor arm (right): (0)   No drift    Motor leg (left): (4)   No movement    Motor leg (right): (4)   No movement    Limb ataxia: (0)   Absent    Sensory: (0)   Normal- no sensory loss    Best language: (0)   Normal- no  aphasia    Dysarthria: (1)   Mild to moderate dysarthria    Extinction and inattention: (0)   No abnormality        Total Score:  17       New Prescriptions    No medications on file       Final diagnoses:   Supratherapeutic INR   Elevated troponin   Anemia due to blood loss, acute       3/7/2020   [facility]     Dain Neal MD  03/07/20 8435

## 2020-03-08 NOTE — DISCHARGE INSTRUCTIONS

## 2020-03-08 NOTE — ED NOTES
Pt is going to [address] [place] [address] 1 [id number], [facility]. [phone]

## 2020-03-08 NOTE — ED NOTES
DATE:  3/7/2020   TIME OF RECEIPT FROM LAB:  [address]  LAB TEST:  troponin  LAB VALUE:  1.565  RESULTS GIVEN WITH READ-BACK TO (PROVIDER):  Dain Neal MD  TIME LAB VALUE REPORTED TO PROVIDER:   1800

## 2020-03-08 NOTE — ED NOTES
[facility] called pt is to stop in the ER, call charge nurse at [phone]  Fax any records too [phone]

## 2020-03-08 NOTE — ED NOTES
DATE:  3/7/2020   TIME OF RECEIPT FROM LAB:  1802  LAB TEST:  INR  LAB VALUE:  6.27  RESULTS GIVEN WITH READ-BACK TO Dr Neal  TIME LAB VALUE REPORTED TO PROVIDER:   1801

## 2020-03-08 NOTE — ED NOTES
Late entry. Dr Neal examined pt in [place]. Pt moving face equally but not answering questions and appears very lethargic on arrival. Pt continued on from hallway to CT.

## 2020-03-11 LAB
BLD PROD TYP BPU: NORMAL
BLD PROD TYP BPU: NORMAL
BLD UNIT ID BPU: 0
BLD UNIT ID BPU: 0
BLOOD PRODUCT CODE: NORMAL
BLOOD PRODUCT CODE: NORMAL
BPU ID: NORMAL
BPU ID: NORMAL
TRANSFUSION STATUS PATIENT QL: NORMAL

## 2020-03-17 LAB — GLUCOSE BLDC GLUCOMTR-MCNC: 109 MG/DL (ref 70–99)
